# Patient Record
Sex: MALE | Race: WHITE | HISPANIC OR LATINO | Employment: OTHER | URBAN - METROPOLITAN AREA
[De-identification: names, ages, dates, MRNs, and addresses within clinical notes are randomized per-mention and may not be internally consistent; named-entity substitution may affect disease eponyms.]

---

## 2021-12-29 ENCOUNTER — CONSULT (OUTPATIENT)
Dept: ENDOCRINOLOGY | Facility: HOSPITAL | Age: 61
End: 2021-12-29
Payer: COMMERCIAL

## 2021-12-29 VITALS
HEIGHT: 64 IN | SYSTOLIC BLOOD PRESSURE: 110 MMHG | HEART RATE: 56 BPM | BODY MASS INDEX: 30.46 KG/M2 | DIASTOLIC BLOOD PRESSURE: 68 MMHG | WEIGHT: 178.4 LBS

## 2021-12-29 DIAGNOSIS — E05.90 THYROTOXICOSIS WITHOUT THYROID STORM, UNSPECIFIED THYROTOXICOSIS TYPE: Primary | ICD-10-CM

## 2021-12-29 PROBLEM — I10 HYPERTENSION: Status: ACTIVE | Noted: 2021-12-29

## 2021-12-29 PROCEDURE — 99204 OFFICE O/P NEW MOD 45 MIN: CPT | Performed by: INTERNAL MEDICINE

## 2021-12-29 RX ORDER — AMLODIPINE BESYLATE 5 MG/1
TABLET ORAL
COMMUNITY
Start: 2021-12-01 | End: 2022-06-03

## 2021-12-29 RX ORDER — LISINOPRIL 40 MG/1
TABLET ORAL
COMMUNITY
Start: 2021-12-08

## 2022-01-03 LAB
ALBUMIN SERPL-MCNC: 4.1 G/DL (ref 3.6–5.1)
ALBUMIN/GLOB SERPL: 1.3 (CALC) (ref 1–2.5)
ALP SERPL-CCNC: 69 U/L (ref 35–144)
ALT SERPL-CCNC: 34 U/L (ref 9–46)
AST SERPL-CCNC: 20 U/L (ref 10–35)
BASOPHILS # BLD AUTO: 33 CELLS/UL (ref 0–200)
BASOPHILS NFR BLD AUTO: 0.5 %
BILIRUB SERPL-MCNC: 0.4 MG/DL (ref 0.2–1.2)
BUN SERPL-MCNC: 25 MG/DL (ref 7–25)
BUN/CREAT SERPL: 17 (CALC) (ref 6–22)
CALCIUM SERPL-MCNC: 8.8 MG/DL (ref 8.6–10.3)
CHLORIDE SERPL-SCNC: 107 MMOL/L (ref 98–110)
CO2 SERPL-SCNC: 27 MMOL/L (ref 20–32)
CREAT SERPL-MCNC: 1.47 MG/DL (ref 0.7–1.25)
EOSINOPHIL # BLD AUTO: 169 CELLS/UL (ref 15–500)
EOSINOPHIL NFR BLD AUTO: 2.6 %
ERYTHROCYTE [DISTWIDTH] IN BLOOD BY AUTOMATED COUNT: 12.6 % (ref 11–15)
GLOBULIN SER CALC-MCNC: 3.2 G/DL (CALC) (ref 1.9–3.7)
GLUCOSE SERPL-MCNC: 88 MG/DL (ref 65–99)
HCT VFR BLD AUTO: 42.1 % (ref 38.5–50)
HGB BLD-MCNC: 14.3 G/DL (ref 13.2–17.1)
LYMPHOCYTES # BLD AUTO: 2665 CELLS/UL (ref 850–3900)
LYMPHOCYTES NFR BLD AUTO: 41 %
MCH RBC QN AUTO: 30 PG (ref 27–33)
MCHC RBC AUTO-ENTMCNC: 34 G/DL (ref 32–36)
MCV RBC AUTO: 88.3 FL (ref 80–100)
MONOCYTES # BLD AUTO: 501 CELLS/UL (ref 200–950)
MONOCYTES NFR BLD AUTO: 7.7 %
NEUTROPHILS # BLD AUTO: 3133 CELLS/UL (ref 1500–7800)
NEUTROPHILS NFR BLD AUTO: 48.2 %
PLATELET # BLD AUTO: 228 THOUSAND/UL (ref 140–400)
PMV BLD REES-ECKER: 10.9 FL (ref 7.5–12.5)
POTASSIUM SERPL-SCNC: 4.4 MMOL/L (ref 3.5–5.3)
PROT SERPL-MCNC: 7.3 G/DL (ref 6.1–8.1)
RBC # BLD AUTO: 4.77 MILLION/UL (ref 4.2–5.8)
SL AMB EGFR AFRICAN AMERICAN: 59 ML/MIN/1.73M2
SL AMB EGFR NON AFRICAN AMERICAN: 51 ML/MIN/1.73M2
SODIUM SERPL-SCNC: 138 MMOL/L (ref 135–146)
T3FREE SERPL-MCNC: 3.5 PG/ML (ref 2.3–4.2)
T4 FREE SERPL-MCNC: 1.3 NG/DL (ref 0.8–1.8)
THYROPEROXIDASE AB SERPL-ACNC: 1 IU/ML
TSH SERPL-ACNC: 0.16 MIU/L (ref 0.4–4.5)
TSI SER-ACNC: <89 % BASELINE
WBC # BLD AUTO: 6.5 THOUSAND/UL (ref 3.8–10.8)

## 2022-01-19 ENCOUNTER — CONSULT (OUTPATIENT)
Dept: NEPHROLOGY | Facility: CLINIC | Age: 62
End: 2022-01-19
Payer: COMMERCIAL

## 2022-01-19 VITALS
HEART RATE: 72 BPM | WEIGHT: 184.6 LBS | BODY MASS INDEX: 31.51 KG/M2 | RESPIRATION RATE: 16 BRPM | DIASTOLIC BLOOD PRESSURE: 100 MMHG | OXYGEN SATURATION: 97 % | TEMPERATURE: 97.5 F | HEIGHT: 64 IN | SYSTOLIC BLOOD PRESSURE: 170 MMHG

## 2022-01-19 DIAGNOSIS — N18.30 CKD (CHRONIC KIDNEY DISEASE) STAGE 3, GFR 30-59 ML/MIN (HCC): ICD-10-CM

## 2022-01-19 PROCEDURE — 99205 OFFICE O/P NEW HI 60 MIN: CPT | Performed by: INTERNAL MEDICINE

## 2022-01-19 NOTE — LETTER
January 19, 2022     Sofia Briseno MD  [address]    Patient: Chang Martinez   YOB: 1960   Date of Visit: 1/19/2022       Dear Dr Umesh Rg:    Thank you for referring Chang Martinez to me for evaluation  Below are my notes for this consultation  If you have questions, please do not hesitate to call me  I look forward to following your patient along with you  Sincerely,        John Zapata MD        CC: No Recipients  John Zapata MD  1/19/2022 10:39 AM  Incomplete  NEPHROLOGY OFFICE CONSULT  Chang Martinez 58 y o  male MRN: [id number]    Encounter: [id number] DATE: 1/19/2022    REASON FOR VISIT: Chang Martinez is a 58 y o male who was referred by Sofia Briseno MD for evaluation  Carlos Castelan HPI:    This is a 58 y o  M with PMH of Hypertension for at least 10 years, obesity, subclinical hyperthyroidism, CKD stage IIIa who is referred to Renal clinic by his PCP due to having new diagnosis of CKD  Patient was notified recently about elevated renal parameters  Upon review of labs from [facility], noted elevated S creatinine of 1 5 in Oct 2021  Labs obtained in Dec 29th, 2021 however revealing S Creatinine of 1 47 with eGFR of 51 as well  Patient admits to suboptimal fluid intake in his life time  States that during the latter half of 2021, his BP readings have been above target necessitating his PCP to increase Lisinopril to 40 mg daily and to start Amlodipine 5 mg daily  He doesn't check BP at home however recent visit to his Endocrinologist revealed BP of 937 mm Hg systolic  He denies foamy urine, edema in LE, flank pain or blood in urine  Denies having h/o NSAIDs in his life time  PAST MEDICAL HISTORY:  Past Medical History:   Diagnosis Date    Chronic kidney disease     Hypertension     Kidney stone        PAST SURGICAL HISTORY:  History reviewed  No pertinent surgical history      SOCIAL HISTORY:  Social History     Substance and Sexual Activity Alcohol Use None     Social History     Substance and Sexual Activity   Drug Use Not on file     Social History     Tobacco Use   Smoking Status Former Smoker   Smokeless Tobacco Never Used       FAMILY HISTORY:  Family History   Problem Relation Age of Onset    Hypertension Mother     Kidney disease Mother     Diabetes unspecified Mother     Heart disease Father     Heart disease Brother        ALLERGY:  Allergies   Allergen Reactions    Penicillins Hives       MEDICATIONS:    Current Outpatient Medications:     amLODIPine (NORVASC) 5 mg tablet, , Disp: , Rfl:     lisinopril (ZESTRIL) 40 mg tablet, , Disp: , Rfl:     REVIEW OF SYSTEMS:    Review of Systems   Constitutional: Negative  HENT: Negative  Eyes: Negative  Respiratory: Negative  Cardiovascular: Negative  Gastrointestinal: Negative  Endocrine: Negative  Genitourinary: Negative  Musculoskeletal: Negative  Skin: Negative  Allergic/Immunologic: Negative  Neurological: Negative  Hematological: Negative  All other systems reviewed and are negative  PHYSICAL EXAM:  Vitals:    01/19/22 0942   BP: 170/100   BP Location: Left arm   Patient Position: Sitting   Cuff Size: Standard   Pulse: 72   Resp: 16   Temp: 97 5 °F (36 4 °C)   TempSrc: Temporal   SpO2: 97%   Weight: 83 7 kg (184 lb 9 6 oz)   Height: 5' 4" (1 626 m)     Body mass index is 31 69 kg/m²  Physical Exam  HENT:      Head: Normocephalic and atraumatic  Eyes:      Pupils: Pupils are equal, round, and reactive to light  Neck:      Vascular: No JVD  Cardiovascular:      Rate and Rhythm: Normal rate and regular rhythm  Heart sounds: Normal heart sounds  No murmur heard  No friction rub  Pulmonary:      Effort: Pulmonary effort is normal       Breath sounds: Normal breath sounds  Abdominal:      General: Bowel sounds are normal  There is no distension  Palpations: Abdomen is soft  Tenderness: There is no abdominal tenderness  There is no rebound  Musculoskeletal:         General: No tenderness  Cervical back: Neck supple  Skin:     General: Skin is dry  Findings: No rash  Neurological:      Mental Status: He is alert and oriented to person, place, and time           LAB RESULTS:  Results for orders placed or performed in visit on 12/29/21   Comprehensive metabolic panel   Result Value Ref Range    Glucose, Random 88 65 - 99 mg/dL    BUN 25 7 - 25 mg/dL    Creatinine 1 47 (H) 0 70 - 1 25 mg/dL    eGFR Non  51 (L) > OR = 60 mL/min/1 73m2    eGFR  59 (L) > OR = 60 mL/min/1 73m2    SL AMB BUN/CREATININE RATIO 17 6 - 22 (calc)    Sodium 138 135 - 146 mmol/L    Potassium 4 4 3 5 - 5 3 mmol/L    Chloride 107 98 - 110 mmol/L    CO2 27 20 - 32 mmol/L    Calcium 8 8 8 6 - 10 3 mg/dL    Protein, Total 7 3 6 1 - 8 1 g/dL    Albumin 4 1 3 6 - 5 1 g/dL    Globulin 3 2 1 9 - 3 7 g/dL (calc)    Albumin/Globulin Ratio 1 3 1 0 - 2 5 (calc)    TOTAL BILIRUBIN 0 4 0 2 - 1 2 mg/dL    Alkaline Phosphatase 69 35 - 144 U/L    AST 20 10 - 35 U/L    ALT 34 9 - 46 U/L   Thyroid stimulating immunoglobulin   Result Value Ref Range    TSI <89 <140 % baseline   CBC and differential   Result Value Ref Range    White Blood Cell Count 6 5 3 8 - 10 8 Thousand/uL    Red Blood Cell Count 4 77 4 20 - 5 80 Million/uL    Hemoglobin 14 3 13 2 - 17 1 g/dL    HCT 42 1 38 5 - 50 0 %    MCV 88 3 80 0 - 100 0 fL    MCH 30 0 27 0 - 33 0 pg    MCHC 34 0 32 0 - 36 0 g/dL    RDW 12 6 11 0 - 15 0 %    Platelet Count 396 544 - 400 Thousand/uL    SL AMB MPV 10 9 7 5 - 12 5 fL    Neutrophils (Absolute) 3,133 1,500 - 7,800 cells/uL    Lymphocytes (Absolute) 2,665 850 - 3,900 cells/uL    Monocytes (Absolute) 501 200 - 950 cells/uL    Eosinophils (Absolute) 169 15 - 500 cells/uL    Basophils ABS 33 0 - 200 cells/uL    Neutrophils 48 2 %    Lymphocytes 41 0 %    Monocytes 7 7 %    Eosinophils 2 6 %    Basophils PCT 0 5 %   Anti-microsomal antibody Result Value Ref Range    Thyroid Peroxidase Antibodies 1 <9 IU/mL   T4, free   Result Value Ref Range    Free t4 1 3 0 8 - 1 8 ng/dL   TSH, 3rd generation   Result Value Ref Range    TSH 0 16 (L) 0 40 - 4 50 mIU/L   T3, free   Result Value Ref Range    Free T3 3 5 2 3 - 4 2 pg/mL         ASSESSMENT and PLAN:  Perla Garcia was seen today for chronic kidney disease and consult  Diagnoses and all orders for this visit:    CKD (chronic kidney disease) stage 3, GFR 30-59 ml/min ([facility])  -     Ambulatory referral to Nephrology  -     Immunoglobulin free LT chains blood; Future  -     Protein electrophoresis, serum; Future  -     US kidney and bladder; Future  -     Albumin; Standing  -     Calcium; Standing  -     CBC and differential; Standing  -     Comprehensive metabolic panel; Standing  -     Phosphorus; Standing  -     PTH, intact; Standing  -     Protein / creatinine ratio, urine; Standing  -     Urinalysis with microscopic; Standing  -     Vitamin D 25 hydroxy; Standing  -     Albumin  -     Calcium  -     CBC and differential  -     Comprehensive metabolic panel  -     Phosphorus  -     PTH, intact  -     Protein / creatinine ratio, urine  -     Urinalysis with microscopic  -     Vitamin D 25 hydroxy       58 y o  M with PMH of Hypertension, CKD stage IIIa, obesity, subclinical hyperthyroidism who presents to Renal clinic for evaluation of CKD  1) Chronic Kidney Disease stage IIIa: Etiology likely hypertensive nephrosclerosis, age  Based on recent labs S creatinine is 1 47 and at baseline  His baseline creatinine is established as 1 4-1 5 mg/dl  Strict BP control recommended to prevent progression of CKD  Avoidance of NSAIDs recommended as well  Obtain Renal US to evaluate renal parenchyma  Obtain UA, BMP, vitamin D, Ca, Phos, Urine Pr:Cr in 3 months  2) Hypertension due to CKD: BP in the office is elevated checked twice    Recommended obtaining home BP reading twice and document  Target BP is 140/90  Low salt diet recommended  On Lisinopril 40 mg daily as well as Amlodipine 5 mg daily    3) Obesity: Weight loss and exercise daily recommended  4) Nutrition : Low salt diet, moderate protein and potassium diet recommended  I have spent 54 minutes with Patient and family today in which greater than 50% of this time was spent in counseling/coordination of care regarding Diagnostic results, Prognosis, Risks and benefits of tx options, Intructions for management, Patient and family education, Importance of tx compliance, Risk factor reductions and Impressions  Abram Bravo

## 2022-01-19 NOTE — PROGRESS NOTES
NEPHROLOGY OFFICE CONSULT  Damien Brumfield 58 y o  male MRN: [id number]    Encounter: [id number] DATE: 1/19/2022    REASON FOR VISIT: Rachael Choe is a 58 y o male who was referred by Monserrat Albarran MD for evaluation  Caren Domingo HPI:    This is a 58 y o  M with PMH of Hypertension for at least 10 years, obesity, subclinical hyperthyroidism, CKD stage IIIa who is referred to Renal clinic by his PCP due to having new diagnosis of CKD  Patient was notified recently about elevated renal parameters  Upon review of labs from [facility], noted elevated S creatinine of 1 5 in Oct 2021  Labs obtained in Dec 29th, 2021 however revealing S Creatinine of 1 47 with eGFR of 51 as well  Patient admits to suboptimal fluid intake in his life time  States that during the latter half of 2021, his BP readings have been above target necessitating his PCP to increase Lisinopril to 40 mg daily and to start Amlodipine 5 mg daily  He doesn't check BP at home however recent visit to his Endocrinologist revealed BP of 899 mm Hg systolic  He denies foamy urine, edema in LE, flank pain or blood in urine  Denies having h/o NSAIDs in his life time  PAST MEDICAL HISTORY:  Past Medical History:   Diagnosis Date    Chronic kidney disease     Hypertension     Kidney stone        PAST SURGICAL HISTORY:  History reviewed  No pertinent surgical history      SOCIAL HISTORY:  Social History     Substance and Sexual Activity   Alcohol Use None     Social History     Substance and Sexual Activity   Drug Use Not on file     Social History     Tobacco Use   Smoking Status Former Smoker   Smokeless Tobacco Never Used       FAMILY HISTORY:  Family History   Problem Relation Age of Onset    Hypertension Mother     Kidney disease Mother     Diabetes unspecified Mother     Heart disease Father     Heart disease Brother        ALLERGY:  Allergies   Allergen Reactions    Penicillins Hives       MEDICATIONS:    Current Outpatient Medications:    amLODIPine (NORVASC) 5 mg tablet, , Disp: , Rfl:     lisinopril (ZESTRIL) 40 mg tablet, , Disp: , Rfl:     REVIEW OF SYSTEMS:    Review of Systems   Constitutional: Negative  HENT: Negative  Eyes: Negative  Respiratory: Negative  Cardiovascular: Negative  Gastrointestinal: Negative  Endocrine: Negative  Genitourinary: Negative  Musculoskeletal: Negative  Skin: Negative  Allergic/Immunologic: Negative  Neurological: Negative  Hematological: Negative  All other systems reviewed and are negative  PHYSICAL EXAM:  Vitals:    01/19/22 0942   BP: 170/100   BP Location: Left arm   Patient Position: Sitting   Cuff Size: Standard   Pulse: 72   Resp: 16   Temp: 97 5 °F (36 4 °C)   TempSrc: Temporal   SpO2: 97%   Weight: 83 7 kg (184 lb 9 6 oz)   Height: 5' 4" (1 626 m)     Body mass index is 31 69 kg/m²  Physical Exam  HENT:      Head: Normocephalic and atraumatic  Eyes:      Pupils: Pupils are equal, round, and reactive to light  Neck:      Vascular: No JVD  Cardiovascular:      Rate and Rhythm: Normal rate and regular rhythm  Heart sounds: Normal heart sounds  No murmur heard  No friction rub  Pulmonary:      Effort: Pulmonary effort is normal       Breath sounds: Normal breath sounds  Abdominal:      General: Bowel sounds are normal  There is no distension  Palpations: Abdomen is soft  Tenderness: There is no abdominal tenderness  There is no rebound  Musculoskeletal:         General: No tenderness  Cervical back: Neck supple  Skin:     General: Skin is dry  Findings: No rash  Neurological:      Mental Status: He is alert and oriented to person, place, and time           LAB RESULTS:  Results for orders placed or performed in visit on 12/29/21   Comprehensive metabolic panel   Result Value Ref Range    Glucose, Random 88 65 - 99 mg/dL    BUN 25 7 - 25 mg/dL    Creatinine 1 47 (H) 0 70 - 1 25 mg/dL    eGFR Non  51 (L) > OR = 60 mL/min/1 73m2    eGFR  59 (L) > OR = 60 mL/min/1 73m2    SL AMB BUN/CREATININE RATIO 17 6 - 22 (calc)    Sodium 138 135 - 146 mmol/L    Potassium 4 4 3 5 - 5 3 mmol/L    Chloride 107 98 - 110 mmol/L    CO2 27 20 - 32 mmol/L    Calcium 8 8 8 6 - 10 3 mg/dL    Protein, Total 7 3 6 1 - 8 1 g/dL    Albumin 4 1 3 6 - 5 1 g/dL    Globulin 3 2 1 9 - 3 7 g/dL (calc)    Albumin/Globulin Ratio 1 3 1 0 - 2 5 (calc)    TOTAL BILIRUBIN 0 4 0 2 - 1 2 mg/dL    Alkaline Phosphatase 69 35 - 144 U/L    AST 20 10 - 35 U/L    ALT 34 9 - 46 U/L   Thyroid stimulating immunoglobulin   Result Value Ref Range    TSI <89 <140 % baseline   CBC and differential   Result Value Ref Range    White Blood Cell Count 6 5 3 8 - 10 8 Thousand/uL    Red Blood Cell Count 4 77 4 20 - 5 80 Million/uL    Hemoglobin 14 3 13 2 - 17 1 g/dL    HCT 42 1 38 5 - 50 0 %    MCV 88 3 80 0 - 100 0 fL    MCH 30 0 27 0 - 33 0 pg    MCHC 34 0 32 0 - 36 0 g/dL    RDW 12 6 11 0 - 15 0 %    Platelet Count 880 138 - 400 Thousand/uL    SL AMB MPV 10 9 7 5 - 12 5 fL    Neutrophils (Absolute) 3,133 1,500 - 7,800 cells/uL    Lymphocytes (Absolute) 2,665 850 - 3,900 cells/uL    Monocytes (Absolute) 501 200 - 950 cells/uL    Eosinophils (Absolute) 169 15 - 500 cells/uL    Basophils ABS 33 0 - 200 cells/uL    Neutrophils 48 2 %    Lymphocytes 41 0 %    Monocytes 7 7 %    Eosinophils 2 6 %    Basophils PCT 0 5 %   Anti-microsomal antibody   Result Value Ref Range    Thyroid Peroxidase Antibodies 1 <9 IU/mL   T4, free   Result Value Ref Range    Free t4 1 3 0 8 - 1 8 ng/dL   TSH, 3rd generation   Result Value Ref Range    TSH 0 16 (L) 0 40 - 4 50 mIU/L   T3, free   Result Value Ref Range    Free T3 3 5 2 3 - 4 2 pg/mL         ASSESSMENT and PLAN:  Jason Ballard was seen today for chronic kidney disease and consult      Diagnoses and all orders for this visit:    CKD (chronic kidney disease) stage 3, GFR 30-59 ml/min ([facility] [address] )  -     Ambulatory referral to Nephrology  - Immunoglobulin free LT chains blood; Future  -     Protein electrophoresis, serum; Future  -     US kidney and bladder; Future  -     Albumin; Standing  -     Calcium; Standing  -     CBC and differential; Standing  -     Comprehensive metabolic panel; Standing  -     Phosphorus; Standing  -     PTH, intact; Standing  -     Protein / creatinine ratio, urine; Standing  -     Urinalysis with microscopic; Standing  -     Vitamin D 25 hydroxy; Standing  -     Albumin  -     Calcium  -     CBC and differential  -     Comprehensive metabolic panel  -     Phosphorus  -     PTH, intact  -     Protein / creatinine ratio, urine  -     Urinalysis with microscopic  -     Vitamin D 25 hydroxy       58 y o  M with PMH of Hypertension, CKD stage IIIa, obesity, subclinical hyperthyroidism who presents to Renal clinic for evaluation of CKD  1) Chronic Kidney Disease stage IIIa: Etiology likely hypertensive nephrosclerosis, age  Based on recent labs S creatinine is 1 47 and at baseline  His baseline creatinine is established as 1 4-1 5 mg/dl  Strict BP control recommended to prevent progression of CKD  Avoidance of NSAIDs recommended as well  Obtain Renal US to evaluate renal parenchyma  Obtain UA, BMP, vitamin D, Ca, Phos, Urine Pr:Cr in 3 months  2) Hypertension due to CKD: BP in the office is elevated checked twice  Recommended obtaining home BP reading twice and document  Target BP is 140/90  Low salt diet recommended  On Lisinopril 40 mg daily as well as Amlodipine 5 mg daily    3) Obesity: Weight loss and exercise daily recommended  4) Nutrition : Low salt diet, moderate protein and potassium diet recommended       I have spent 54 minutes with Patient and family today in which greater than 50% of this time was spent in counseling/coordination of care regarding Diagnostic results, Prognosis, Risks and benefits of tx options, Intructions for management, Patient and family education, Importance of tx compliance, Risk factor reductions and Impressions  Ricco Espana

## 2022-01-19 NOTE — PATIENT INSTRUCTIONS
Target fluid intake 2-2 5 L daily  Avoid NSAIDs  Ultrasound of kidney in 1 month   Check BP twice daily  Target -075 systolic   Diastolic < 90  Brand name for BP machine is [employer]

## 2022-01-20 ENCOUNTER — HOSPITAL ENCOUNTER (OUTPATIENT)
Dept: NUCLEAR MEDICINE | Facility: HOSPITAL | Age: 62
Discharge: HOME | End: 2022-01-20
Attending: INTERNAL MEDICINE
Payer: COMMERCIAL

## 2022-01-20 DIAGNOSIS — E05.90 THYROTOXICOSIS WITHOUT THYROID STORM, UNSPECIFIED THYROTOXICOSIS TYPE: ICD-10-CM

## 2022-01-20 PROCEDURE — A9516 IODINE I-123 SOD IODIDE MIC: HCPCS

## 2022-01-20 PROCEDURE — G1004 CDSM NDSC: HCPCS

## 2022-01-20 PROCEDURE — 78014 THYROID IMAGING W/BLOOD FLOW: CPT

## 2022-01-21 ENCOUNTER — HOSPITAL ENCOUNTER (OUTPATIENT)
Dept: NUCLEAR MEDICINE | Facility: HOSPITAL | Age: 62
Discharge: HOME | End: 2022-01-21
Attending: INTERNAL MEDICINE
Payer: COMMERCIAL

## 2022-01-28 ENCOUNTER — TELEPHONE (OUTPATIENT)
Dept: ENDOCRINOLOGY | Facility: HOSPITAL | Age: 62
End: 2022-01-28

## 2022-01-28 NOTE — TELEPHONE ENCOUNTER
vilma called back and given message from result note  Lab slips faxed to [facility] and US order is mailed  Can the other note be closed?

## 2022-02-21 ENCOUNTER — HOSPITAL ENCOUNTER (OUTPATIENT)
Dept: ULTRASOUND IMAGING | Facility: HOSPITAL | Age: 62
Discharge: HOME | End: 2022-02-21
Attending: INTERNAL MEDICINE
Payer: COMMERCIAL

## 2022-02-21 DIAGNOSIS — N18.30 CKD (CHRONIC KIDNEY DISEASE) STAGE 3, GFR 30-59 ML/MIN (HCC): ICD-10-CM

## 2022-02-21 DIAGNOSIS — E05.90 THYROTOXICOSIS WITHOUT THYROID STORM, UNSPECIFIED THYROTOXICOSIS TYPE: ICD-10-CM

## 2022-02-21 DIAGNOSIS — E04.1 THYROID NODULE: ICD-10-CM

## 2022-02-21 LAB
T3FREE SERPL-MCNC: 3.5 PG/ML (ref 2.3–4.2)
T4 FREE SERPL-MCNC: 1.4 NG/DL (ref 0.8–1.8)
TSH SERPL-ACNC: 0.12 MIU/L (ref 0.4–4.5)

## 2022-02-21 PROCEDURE — 76536 US EXAM OF HEAD AND NECK: CPT

## 2022-02-21 PROCEDURE — 76770 US EXAM ABDO BACK WALL COMP: CPT

## 2022-02-24 NOTE — RESULT ENCOUNTER NOTE
Please call the patient regarding result  Recent thyroid ultrasound shows only small scattered colloid cysts  No dominant nodules are noted  Readings do not meet criteria for biopsy or follow-up ultrasounds at this time

## 2022-05-21 LAB
T3FREE SERPL-MCNC: 3.2 PG/ML (ref 2.3–4.2)
T4 FREE SERPL-MCNC: 1 NG/DL (ref 0.8–1.8)
TSH SERPL-ACNC: 1.08 MIU/L (ref 0.4–4.5)

## 2022-05-23 ENCOUNTER — TELEPHONE (OUTPATIENT)
Dept: NEPHROLOGY | Facility: CLINIC | Age: 62
End: 2022-05-23

## 2022-05-23 NOTE — TELEPHONE ENCOUNTER
Patient called the office regarding his appointment with Dr Irving Pitts for 06/03/2022  Patient stated if his blood work script can be fax to [facility]      Blood work script faxed to [facility] Tel number [phone]        Faxed to [phone]

## 2022-05-24 ENCOUNTER — TELEPHONE (OUTPATIENT)
Dept: NEPHROLOGY | Facility: CLINIC | Age: 62
End: 2022-05-24

## 2022-05-27 ENCOUNTER — OFFICE VISIT (OUTPATIENT)
Dept: ENDOCRINOLOGY | Facility: HOSPITAL | Age: 62
End: 2022-05-27
Payer: COMMERCIAL

## 2022-05-27 VITALS
OXYGEN SATURATION: 96 % | HEIGHT: 64 IN | WEIGHT: 185 LBS | HEART RATE: 53 BPM | SYSTOLIC BLOOD PRESSURE: 152 MMHG | DIASTOLIC BLOOD PRESSURE: 86 MMHG | BODY MASS INDEX: 31.58 KG/M2

## 2022-05-27 DIAGNOSIS — E05.90 THYROTOXICOSIS WITHOUT THYROID STORM, UNSPECIFIED THYROTOXICOSIS TYPE: ICD-10-CM

## 2022-05-27 DIAGNOSIS — E04.1 THYROID NODULE: Primary | ICD-10-CM

## 2022-05-27 DIAGNOSIS — R63.5 WEIGHT GAIN: ICD-10-CM

## 2022-05-27 PROCEDURE — 99214 OFFICE O/P EST MOD 30 MIN: CPT | Performed by: INTERNAL MEDICINE

## 2022-05-27 RX ORDER — DEXAMETHASONE 1 MG
TABLET ORAL
Qty: 1 TABLET | Refills: 0 | Status: SHIPPED | OUTPATIENT
Start: 2022-05-27

## 2022-05-27 RX ORDER — METHIMAZOLE 5 MG/1
TABLET ORAL
Qty: 180 TABLET | Refills: 1 | Status: SHIPPED | OUTPATIENT
Start: 2022-05-27

## 2022-05-27 RX ORDER — ATORVASTATIN CALCIUM 10 MG/1
10 TABLET, FILM COATED ORAL DAILY
COMMUNITY
Start: 2022-04-29

## 2022-05-27 NOTE — PROGRESS NOTES
5/27/2022    Assessment/Plan      Diagnoses and all orders for this visit:    Thyroid nodule    Thyrotoxicosis without thyroid storm, unspecified thyrotoxicosis type  -     methimazole (TAPAZOLE) 5 mg tablet; 1 tab bid  -     TSH, 3rd generation; Future  -     T3, free; Future  -     T4, free; Future    Weight gain  -     Dexamethasone  -     dexamethasone (DECADRON) 1 mg tablet; Take 1 tab at 10:00 p m  The night before cortisol blood work  -     Cortisol Level, AM Specimen  -     Testosterone, free, total Lab Collect  -     Comprehensive metabolic panel Lab Collect  -     Insulin-like growth factor 1 (IGF-1) - Lab Collect  -     DHEA-sulfate- Lab Collect    Other orders  -     atorvastatin (LIPITOR) 10 mg tablet; Take 10 mg by mouth daily        Assessment/Plan:  1  Hyperthyroidism: Reduce methimazole to 10 mg total per day and repeat lab work which may not be done in so he is back from his trip  Follow-up in the office in 6 months  2  Weight gain: I do not believe this is related to the thyroid  I have offered additional testing as ordered above we will call the results  CC: Follow-up    History of Present Illness     HPI: Luisito Villasenor is a 58y o  year old male presents for a follow-up of subclinical hyperthyroidism  During initial workup, thyroid antibodies were negative  There was not increased uptake seen on thyroid scan  Ultrasound of the thyroid showed scattered colloid cysts but no dominant nodules  He was started at and methimazole at a dose of 5 mg which she has been taking 3 times daily  He presents today overall feeling okay  He continues to report unintentional weight gain  No temperature intolerance, fatigue, palpitations, tachycardia  He will be going to [facility] for 3 months  Review of Systems   Constitutional: Positive for unexpected weight change (gain)  Negative for fatigue  HENT: Negative for trouble swallowing and voice change  Eyes: Negative for visual disturbance  Respiratory: Negative for shortness of breath  Cardiovascular: Negative for palpitations and leg swelling  Gastrointestinal: Negative for abdominal pain, nausea and vomiting  Endocrine: Negative for polydipsia and polyuria  Musculoskeletal: Negative for arthralgias and myalgias  Skin: Negative for rash  Neurological: Negative for dizziness, tremors and weakness  Hematological: Negative for adenopathy  Psychiatric/Behavioral: Negative for agitation and confusion  Historical Information   Past Medical History:   Diagnosis Date    Chronic kidney disease     Hypertension     Kidney stone      History reviewed  No pertinent surgical history  Social History   Social History     Substance and Sexual Activity   Alcohol Use None     Social History     Substance and Sexual Activity   Drug Use Not on file     Social History     Tobacco Use   Smoking Status Former Smoker   Smokeless Tobacco Never Used     Family History:   Family History   Problem Relation Age of Onset    Hypertension Mother     Kidney disease Mother     Diabetes unspecified Mother     Heart disease Father     Heart disease Brother        Meds/Allergies   Current Outpatient Medications   Medication Sig Dispense Refill    amLODIPine (NORVASC) 5 mg tablet       atorvastatin (LIPITOR) 10 mg tablet Take 10 mg by mouth daily      dexamethasone (DECADRON) 1 mg tablet Take 1 tab at 10:00 p m  The night before cortisol blood work  1 tablet 0    lisinopril (ZESTRIL) 40 mg tablet       methimazole (TAPAZOLE) 5 mg tablet 1 tab bid 180 tablet 1     No current facility-administered medications for this visit  Allergies   Allergen Reactions    Penicillins Hives       Objective   Vitals: Blood pressure 152/86, pulse (!) 53, height 5' 4" (1 626 m), weight 83 9 kg (185 lb), SpO2 96 %  Invasive Devices  Report    None                 Physical Exam  Vitals reviewed  Constitutional:       General: He is not in acute distress  Appearance: He is well-developed  He is not diaphoretic  HENT:      Head: Normocephalic and atraumatic  Eyes:      Conjunctiva/sclera: Conjunctivae normal       Pupils: Pupils are equal, round, and reactive to light  Neck:      Thyroid: No thyromegaly  Cardiovascular:      Rate and Rhythm: Normal rate and regular rhythm  Pulmonary:      Effort: Pulmonary effort is normal  No respiratory distress  Breath sounds: Normal breath sounds  Abdominal:      General: Bowel sounds are normal       Palpations: Abdomen is soft  Musculoskeletal:         General: Normal range of motion  Cervical back: Normal range of motion and neck supple  Skin:     General: Skin is warm and dry  Findings: No rash  Neurological:      Mental Status: He is alert and oriented to person, place, and time  Motor: No abnormal muscle tone  Psychiatric:         Behavior: Behavior normal          The history was obtained from the review of the chart and from the patient  Lab Results:      Recent Results (from the past 84878 hour(s))   NOVEL CORONAVIRUS (COVID-19), PCR [facility]    Collection Time: 12/08/21  1:41 PM    Specimen type and source: Nasogastric aspirate (specimen)   Result Value Ref Range    SARS Coronavirus 2 PCR Not Detected Not Detected    Specimen Description NASOPHARYNX     First test? Unknown     Prior test type? Not applicable     Prior test result? Not applicable     Prior test date?       ^^^UNKNA^Unknown or not applicable^L^^^Unknown or not applicable    Employed in healthcare setting? Unknown     Symptomatic as defined by CDC? Unknown     Date of symptom onset?       ^^^UNKNA^Unknown or not applicable^L^^^Unknown or not applicable    Currently hospitalized? Unknown     In ICU? Unknown     Resident in congregate care setting? Unknown     Pregnant?  Not applicable    Comprehensive metabolic panel    Collection Time: 12/29/21  9:27 AM   Result Value Ref Range    Glucose, Random 88 65 - 99 mg/dL BUN 25 7 - 25 mg/dL    Creatinine 1 47 (H) 0 70 - 1 25 mg/dL    eGFR Non  51 (L) > OR = 60 mL/min/1 73m2    eGFR  59 (L) > OR = 60 mL/min/1 73m2    SL AMB BUN/CREATININE RATIO 17 6 - 22 (calc)    Sodium 138 135 - 146 mmol/L    Potassium 4 4 3 5 - 5 3 mmol/L    Chloride 107 98 - 110 mmol/L    CO2 27 20 - 32 mmol/L    Calcium 8 8 8 6 - 10 3 mg/dL    Protein, Total 7 3 6 1 - 8 1 g/dL    Albumin 4 1 3 6 - 5 1 g/dL    Globulin 3 2 1 9 - 3 7 g/dL (calc)    Albumin/Globulin Ratio 1 3 1 0 - 2 5 (calc)    TOTAL BILIRUBIN 0 4 0 2 - 1 2 mg/dL    Alkaline Phosphatase 69 35 - 144 U/L    AST 20 10 - 35 U/L    ALT 34 9 - 46 U/L   Thyroid stimulating immunoglobulin    Collection Time: 12/29/21  9:27 AM   Result Value Ref Range    TSI <89 <140 % baseline   CBC and differential    Collection Time: 12/29/21  9:27 AM   Result Value Ref Range    White Blood Cell Count 6 5 3 8 - 10 8 Thousand/uL    Red Blood Cell Count 4 77 4 20 - 5 80 Million/uL    Hemoglobin 14 3 13 2 - 17 1 g/dL    HCT 42 1 38 5 - 50 0 %    MCV 88 3 80 0 - 100 0 fL    MCH 30 0 27 0 - 33 0 pg    MCHC 34 0 32 0 - 36 0 g/dL    RDW 12 6 11 0 - 15 0 %    Platelet Count 883 659 - 400 Thousand/uL    SL AMB MPV 10 9 7 5 - 12 5 fL    Neutrophils (Absolute) 3,133 1,500 - 7,800 cells/uL    Lymphocytes (Absolute) 2,665 850 - 3,900 cells/uL    Monocytes (Absolute) 501 200 - 950 cells/uL    Eosinophils (Absolute) 169 15 - 500 cells/uL    Basophils ABS 33 0 - 200 cells/uL    Neutrophils 48 2 %    Lymphocytes 41 0 %    Monocytes 7 7 %    Eosinophils 2 6 %    Basophils PCT 0 5 %   Anti-microsomal antibody    Collection Time: 12/29/21  9:27 AM   Result Value Ref Range    Thyroid Peroxidase Antibodies 1 <9 IU/mL   T4, free    Collection Time: 12/29/21  9:27 AM   Result Value Ref Range    Free t4 1 3 0 8 - 1 8 ng/dL   TSH, 3rd generation    Collection Time: 12/29/21  9:27 AM   Result Value Ref Range    TSH 0 16 (L) 0 40 - 4 50 mIU/L   T3, free Collection Time: 12/29/21  9:27 AM   Result Value Ref Range    Free T3 3 5 2 3 - 4 2 pg/mL   T4, free    Collection Time: 02/21/22  8:45 AM   Result Value Ref Range    Free t4 1 4 0 8 - 1 8 ng/dL   TSH, 3rd generation    Collection Time: 02/21/22  8:45 AM   Result Value Ref Range    TSH 0 12 (L) 0 40 - 4 50 mIU/L   T3, free    Collection Time: 02/21/22  8:45 AM   Result Value Ref Range    Free T3 3 5 2 3 - 4 2 pg/mL   [facility]    Collection Time: 03/13/22 11:16 AM    Specimen: Other   Result Value Ref Range    SARS-CoV-2 Negative Negative   T4, free    Collection Time: 05/20/22  1:10 PM   Result Value Ref Range    Free t4 1 0 0 8 - 1 8 ng/dL   TSH, 3rd generation    Collection Time: 05/20/22  1:10 PM   Result Value Ref Range    TSH 1 08 0 40 - 4 50 mIU/L   T3, free    Collection Time: 05/20/22  1:10 PM   Result Value Ref Range    Free T3 3 2 2 3 - 4 2 pg/mL         Future Appointments   Date Time Provider [facility]   6/3/2022  9:30 AM Shirley Arboleda MD [address] [place] [address]       Portions of the record may have been created with voice recognition software  Occasional wrong word or "sound a like" substitutions may have occurred due to the inherent limitations of voice recognition software  Read the chart carefully and recognize, using context, where substitutions have occurred

## 2022-05-28 LAB
25(OH)D3 SERPL-MCNC: 24 NG/ML (ref 30–100)
ALBUMIN SERPL-MCNC: 4.2 G/DL (ref 3.6–5.1)
ALBUMIN/GLOB SERPL: 1.4 (CALC) (ref 1–2.5)
ALP SERPL-CCNC: 66 U/L (ref 35–144)
ALT SERPL-CCNC: 17 U/L (ref 9–46)
APPEARANCE UR: CLEAR
AST SERPL-CCNC: 14 U/L (ref 10–35)
BACTERIA UR QL AUTO: ABNORMAL /HPF
BASOPHILS # BLD AUTO: 43 CELLS/UL (ref 0–200)
BASOPHILS NFR BLD AUTO: 0.7 %
BILIRUB SERPL-MCNC: 0.5 MG/DL (ref 0.2–1.2)
BILIRUB UR QL STRIP: NEGATIVE
BUN SERPL-MCNC: 21 MG/DL (ref 7–25)
BUN/CREAT SERPL: 13 (CALC) (ref 6–22)
CALCIUM SERPL-MCNC: 9.2 MG/DL (ref 8.6–10.3)
CALCIUM SERPL-MCNC: 9.2 MG/DL (ref 8.6–10.3)
CHLORIDE SERPL-SCNC: 106 MMOL/L (ref 98–110)
CO2 SERPL-SCNC: 27 MMOL/L (ref 20–32)
COLOR UR: YELLOW
CREAT SERPL-MCNC: 1.6 MG/DL (ref 0.7–1.25)
CREAT UR-MCNC: 50 MG/DL (ref 20–320)
EOSINOPHIL # BLD AUTO: 174 CELLS/UL (ref 15–500)
EOSINOPHIL NFR BLD AUTO: 2.8 %
ERYTHROCYTE [DISTWIDTH] IN BLOOD BY AUTOMATED COUNT: 12.5 % (ref 11–15)
GLOBULIN SER CALC-MCNC: 3.1 G/DL (CALC) (ref 1.9–3.7)
GLUCOSE SERPL-MCNC: 87 MG/DL (ref 65–99)
GLUCOSE UR QL STRIP: NEGATIVE
HCT VFR BLD AUTO: 39.9 % (ref 38.5–50)
HGB BLD-MCNC: 13.4 G/DL (ref 13.2–17.1)
HGB UR QL STRIP: NEGATIVE
HYALINE CASTS #/AREA URNS LPF: ABNORMAL /LPF
KETONES UR QL STRIP: NEGATIVE
LEUKOCYTE ESTERASE UR QL STRIP: NEGATIVE
LYMPHOCYTES # BLD AUTO: 2350 CELLS/UL (ref 850–3900)
LYMPHOCYTES NFR BLD AUTO: 37.9 %
MCH RBC QN AUTO: 29.3 PG (ref 27–33)
MCHC RBC AUTO-ENTMCNC: 33.6 G/DL (ref 32–36)
MCV RBC AUTO: 87.3 FL (ref 80–100)
MONOCYTES # BLD AUTO: 465 CELLS/UL (ref 200–950)
MONOCYTES NFR BLD AUTO: 7.5 %
NEUTROPHILS # BLD AUTO: 3168 CELLS/UL (ref 1500–7800)
NEUTROPHILS NFR BLD AUTO: 51.1 %
NITRITE UR QL STRIP: NEGATIVE
PH UR STRIP: 5.5 [PH] (ref 5–8)
PHOSPHATE SERPL-MCNC: 3 MG/DL (ref 2.5–4.5)
PLATELET # BLD AUTO: 230 THOUSAND/UL (ref 140–400)
PMV BLD REES-ECKER: 10.5 FL (ref 7.5–12.5)
POTASSIUM SERPL-SCNC: 4.2 MMOL/L (ref 3.5–5.3)
PROT SERPL-MCNC: 7.3 G/DL (ref 6.1–8.1)
PROT UR QL STRIP: ABNORMAL
PROT UR-MCNC: 82 MG/DL (ref 5–25)
PROT/CREAT UR: 1.64 MG/MG CREAT (ref 0.02–0.13)
PROT/CREAT UR: 1640 MG/G CREAT (ref 22–128)
PTH-INTACT SERPL-MCNC: 32 PG/ML (ref 16–77)
RBC # BLD AUTO: 4.57 MILLION/UL (ref 4.2–5.8)
RBC #/AREA URNS HPF: ABNORMAL /HPF
SL AMB EGFR AFRICAN AMERICAN: 53 ML/MIN/1.73M2
SL AMB EGFR NON AFRICAN AMERICAN: 45 ML/MIN/1.73M2
SODIUM SERPL-SCNC: 140 MMOL/L (ref 135–146)
SP GR UR STRIP: 1.01 (ref 1–1.03)
SQUAMOUS #/AREA URNS HPF: ABNORMAL /HPF
WBC # BLD AUTO: 6.2 THOUSAND/UL (ref 3.8–10.8)
WBC #/AREA URNS HPF: ABNORMAL /HPF

## 2022-06-03 ENCOUNTER — OFFICE VISIT (OUTPATIENT)
Dept: NEPHROLOGY | Facility: CLINIC | Age: 62
End: 2022-06-03
Payer: COMMERCIAL

## 2022-06-03 VITALS
HEART RATE: 68 BPM | SYSTOLIC BLOOD PRESSURE: 150 MMHG | OXYGEN SATURATION: 96 % | DIASTOLIC BLOOD PRESSURE: 80 MMHG | WEIGHT: 184 LBS | BODY MASS INDEX: 31.41 KG/M2 | HEIGHT: 64 IN | TEMPERATURE: 97.4 F | RESPIRATION RATE: 16 BRPM

## 2022-06-03 DIAGNOSIS — I10 PRIMARY HYPERTENSION: Primary | ICD-10-CM

## 2022-06-03 DIAGNOSIS — N18.32 STAGE 3B CHRONIC KIDNEY DISEASE (HCC): ICD-10-CM

## 2022-06-03 PROCEDURE — 99214 OFFICE O/P EST MOD 30 MIN: CPT | Performed by: INTERNAL MEDICINE

## 2022-06-03 RX ORDER — AMLODIPINE BESYLATE 5 MG/1
10 TABLET ORAL DAILY
Qty: 180 TABLET | Refills: 3 | Status: SHIPPED | OUTPATIENT
Start: 2022-06-03

## 2022-06-03 NOTE — PROGRESS NOTES
NEPHROLOGY PROGRESS NOTE    Patient: Tomasa Persaud               Sex: male          DOA: No admission date for patient encounter  YOB: 1960        Age:  58 y o           6/3/2022        BACKGROUND     58 M with PMH of Hypertension, hyperthyroidism, CKD IIIa who is following up in Renal clinic since Jan 2022    SUBJECTIVE     Patient following up in Renal clinic after 4 months  Checks his BP often with most BP reading being 729 mm Hg systolic  Admits to at least 96 oz of water daily  Avoids NSAIDs  Denies seeing foamy urine or blood in urine  REVIEW OF SYSTEMS     Review of Systems   Constitutional: Negative  HENT: Negative  Eyes: Negative  Respiratory: Negative  Cardiovascular: Negative  Gastrointestinal: Negative  Endocrine: Negative  Genitourinary: Negative  Musculoskeletal: Negative  Skin: Negative  Allergic/Immunologic: Negative  Neurological: Negative  Hematological: Negative  All other systems reviewed and are negative  OBJECTIVE     Current Weight: Weight - Scale: 83 5 kg (184 lb)  Vitals:    06/03/22 0926   BP: 150/80   Pulse: 68   Resp: 16   Temp: (!) 97 4 °F (36 3 °C)   SpO2: 96%     Body mass index is 31 58 kg/m²  CURRENT MEDICATIONS       Current Outpatient Medications:     amLODIPine (NORVASC) 5 mg tablet, Take 2 tablets (10 mg total) by mouth daily, Disp: 180 tablet, Rfl: 3    atorvastatin (LIPITOR) 10 mg tablet, Take 10 mg by mouth daily, Disp: , Rfl:     dexamethasone (DECADRON) 1 mg tablet, Take 1 tab at 10:00 p m  The night before cortisol blood work , Disp: 1 tablet, Rfl: 0    lisinopril (ZESTRIL) 40 mg tablet, , Disp: , Rfl:     methimazole (TAPAZOLE) 5 mg tablet, 1 tab bid (Patient taking differently: 2 (two) times a day), Disp: 180 tablet, Rfl: 1      PHYSICAL EXAMINATION     Physical Exam  HENT:      Head: Normocephalic and atraumatic  Eyes:      Pupils: Pupils are equal, round, and reactive to light     Neck: Vascular: No JVD  Cardiovascular:      Rate and Rhythm: Normal rate and regular rhythm  Heart sounds: Normal heart sounds  No murmur heard  No friction rub  Pulmonary:      Effort: Pulmonary effort is normal       Breath sounds: Normal breath sounds  Abdominal:      General: Bowel sounds are normal  There is no distension  Palpations: Abdomen is soft  Tenderness: There is no abdominal tenderness  There is no rebound  Musculoskeletal:         General: No tenderness  Cervical back: Neck supple  Skin:     General: Skin is dry  Findings: No rash  Neurological:      Mental Status: He is alert and oriented to person, place, and time  LAB RESULTS       Outside labs:   5/27/22  Creatinine=1 6  Urine Pr:Cr=1 6 g        RADIOLOGY RESULTS      Reviewed    ASSESSMENT/PLAN     58 M with PMH of Hypertension, hyperthyroidism, CKD IIIa who is following up in Renal clinic    1) Chronic Kidney Disease stage IIIa: Rapid decline in renal parameters since 2020 with relatively well controlled hypertension  Creatinine was 1 5 mg/dl in Dec 2021  Now noted to be 1 6 mg/dl despite adequate hydration  Renal US with bilateral cysts in kidney  Obtain paraproteinemia work up today due to proteinuria noted  2) Proteinuria: Noted to have 1 6 g of proteinuria  Etiology likely hypertensive nephrosclerosis   Will rule out paraproteinemia  Repeat renal parameters including urine studies in 3 months once patient returns from [facility]  If worsening proteinuria and/or creatinine, consider renal biopsy    3) Hypertension due to CKD: BP is elevated at 869 mm Hg systolic  Increase Amlodipine to 5 mg PO bid  Low salt diet recommended        I have spent 46 minutes with Patient and family today in which greater than 50% of this time was spent in counseling/coordination of care regarding Diagnostic results, Prognosis, Risks and benefits of tx options, Intructions for management, Patient and family education, Importance of tx compliance, Risk factor reductions and Impressions              Sylvain Love MD  Nephrology  6/3/2022

## 2022-06-03 NOTE — LETTER
Kasey 3, 2022     Brett Interiano MD  [address] [facility] [address] [facility] [place]    Patient: Minda Steven   YOB: 1960   Date of Visit: 6/3/2022       Dear Dr Gladys Juan:    Thank you for referring Minda Steven to me for evaluation  Below are my notes for this consultation  If you have questions, please do not hesitate to call me  I look forward to following your patient along with you  Sincerely,        Vandana Looney MD        CC: No Recipients  Vandana Looney MD  6/3/2022 10:27 AM  Incomplete  NEPHROLOGY PROGRESS NOTE    Patient: Minda Steven               Sex: male          DOA: No admission date for patient encounter  YOB: 1960        Age:  58 y o           6/3/2022        BACKGROUND     58 M with PMH of Hypertension, hyperthyroidism, CKD IIIa who is following up in Renal clinic since Jan 2022    SUBJECTIVE     Patient following up in Renal clinic after 4 months  Checks his BP often with most BP reading being 901 mm Hg systolic  Admits to at least 96 oz of water daily  Avoids NSAIDs  Denies seeing foamy urine or blood in urine  REVIEW OF SYSTEMS     Review of Systems   Constitutional: Negative  HENT: Negative  Eyes: Negative  Respiratory: Negative  Cardiovascular: Negative  Gastrointestinal: Negative  Endocrine: Negative  Genitourinary: Negative  Musculoskeletal: Negative  Skin: Negative  Allergic/Immunologic: Negative  Neurological: Negative  Hematological: Negative  All other systems reviewed and are negative  OBJECTIVE     Current Weight: Weight - Scale: 83 5 kg (184 lb)  Vitals:    06/03/22 0926   BP: 150/80   Pulse: 68   Resp: 16   Temp: (!) 97 4 °F (36 3 °C)   SpO2: 96%     Body mass index is 31 58 kg/m²        CURRENT MEDICATIONS       Current Outpatient Medications:     amLODIPine (NORVASC) 5 mg tablet, Take 2 tablets (10 mg total) by mouth daily, Disp: 180 tablet, Rfl: 3    atorvastatin (LIPITOR) 10 mg tablet, Take 10 mg by mouth daily, Disp: , Rfl:     dexamethasone (DECADRON) 1 mg tablet, Take 1 tab at 10:00 p m  The night before cortisol blood work , Disp: 1 tablet, Rfl: 0    lisinopril (ZESTRIL) 40 mg tablet, , Disp: , Rfl:     methimazole (TAPAZOLE) 5 mg tablet, 1 tab bid (Patient taking differently: 2 (two) times a day), Disp: 180 tablet, Rfl: 1      PHYSICAL EXAMINATION     Physical Exam  HENT:      Head: Normocephalic and atraumatic  Eyes:      Pupils: Pupils are equal, round, and reactive to light  Neck:      Vascular: No JVD  Cardiovascular:      Rate and Rhythm: Normal rate and regular rhythm  Heart sounds: Normal heart sounds  No murmur heard  No friction rub  Pulmonary:      Effort: Pulmonary effort is normal       Breath sounds: Normal breath sounds  Abdominal:      General: Bowel sounds are normal  There is no distension  Palpations: Abdomen is soft  Tenderness: There is no abdominal tenderness  There is no rebound  Musculoskeletal:         General: No tenderness  Cervical back: Neck supple  Skin:     General: Skin is dry  Findings: No rash  Neurological:      Mental Status: He is alert and oriented to person, place, and time  LAB RESULTS       Outside labs:   5/27/22  Creatinine=1 6  Urine Pr:Cr=1 6 g        RADIOLOGY RESULTS      Reviewed    ASSESSMENT/PLAN     58 M with PMH of Hypertension, hyperthyroidism, CKD IIIa who is following up in Renal clinic    1) Chronic Kidney Disease stage IIIa: Rapid decline in renal parameters since 2020 with relatively well controlled hypertension  Creatinine was 1 5 mg/dl in Dec 2021  Now noted to be 1 6 mg/dl despite adequate hydration  Renal US with bilateral cysts in kidney  Obtain paraproteinemia work up today due to proteinuria noted  2) Proteinuria: Noted to have 1 6 g of proteinuria     Etiology likely hypertensive nephrosclerosis   Will rule out paraproteinemia  Repeat renal parameters including urine studies in 3 months once patient returns from [facility]  If worsening proteinuria and/or creatinine, consider renal biopsy    3) Hypertension due to CKD: BP is elevated at 847 mm Hg systolic  Increase Amlodipine to 5 mg PO bid  Low salt diet recommended  I have spent 46 minutes with Patient and family today in which greater than 50% of this time was spent in counseling/coordination of care regarding Diagnostic results, Prognosis, Risks and benefits of tx options, Intructions for management, Patient and family education, Importance of tx compliance, Risk factor reductions and Impressions              Tevin Bravo MD  Nephrology  6/3/2022

## 2022-06-06 ENCOUNTER — TELEPHONE (OUTPATIENT)
Dept: NEPHROLOGY | Facility: CLINIC | Age: 62
End: 2022-06-06

## 2022-06-06 LAB
ALBUMIN SERPL ELPH-MCNC: 4.2 G/DL (ref 3.8–4.8)
ALPHA1 GLOB SERPL ELPH-MCNC: 0.3 G/DL (ref 0.2–0.3)
ALPHA2 GLOB SERPL ELPH-MCNC: 0.6 G/DL (ref 0.5–0.9)
BETA1 GLOB SERPL ELPH-MCNC: 0.5 G/DL (ref 0.4–0.6)
BETA2 GLOB SERPL ELPH-MCNC: 0.5 G/DL (ref 0.2–0.5)
GAMMA GLOB SERPL ELPH-MCNC: 1.6 G/DL (ref 0.8–1.7)
KAPPA LC FREE SER-MCNC: 44.1 MG/L (ref 3.3–19.4)
KAPPA LC FREE/LAMBDA FREE SER: 1.28 {RATIO} (ref 0.26–1.65)
LAMBDA LC FREE SERPL-MCNC: 34.4 MG/L (ref 5.7–26.3)
PROT PATTERN SERPL ELPH-IMP: NORMAL
PROT SERPL-MCNC: 7.6 G/DL (ref 6.1–8.1)

## 2022-06-06 NOTE — TELEPHONE ENCOUNTER
Good Morning,    Dr Krish Henderson just [address] [address] [place] patient's wife Alicja Munguia call the office to let you know patient's blood work was done           [phone]

## 2022-09-18 DIAGNOSIS — E05.90 THYROTOXICOSIS WITHOUT THYROID STORM, UNSPECIFIED THYROTOXICOSIS TYPE: ICD-10-CM

## 2022-09-19 RX ORDER — METHIMAZOLE 5 MG/1
TABLET ORAL
Qty: 180 TABLET | Refills: 0 | Status: SHIPPED | OUTPATIENT
Start: 2022-09-19

## 2022-10-04 ENCOUNTER — TELEPHONE (OUTPATIENT)
Dept: NEPHROLOGY | Facility: CLINIC | Age: 62
End: 2022-10-04

## 2022-10-05 ENCOUNTER — OFFICE VISIT (OUTPATIENT)
Dept: NEPHROLOGY | Facility: CLINIC | Age: 62
End: 2022-10-05
Payer: COMMERCIAL

## 2022-10-05 VITALS
BODY MASS INDEX: 31.76 KG/M2 | RESPIRATION RATE: 16 BRPM | OXYGEN SATURATION: 96 % | SYSTOLIC BLOOD PRESSURE: 160 MMHG | HEART RATE: 74 BPM | TEMPERATURE: 97.8 F | WEIGHT: 186 LBS | HEIGHT: 64 IN | DIASTOLIC BLOOD PRESSURE: 90 MMHG

## 2022-10-05 DIAGNOSIS — N18.31 STAGE 3A CHRONIC KIDNEY DISEASE (HCC): Primary | ICD-10-CM

## 2022-10-05 PROCEDURE — 99214 OFFICE O/P EST MOD 30 MIN: CPT | Performed by: INTERNAL MEDICINE

## 2022-10-05 NOTE — PROGRESS NOTES
NEPHROLOGY PROGRESS NOTE    Patient: Eren Sam               Sex: male          DOA: No admission date for patient encounter  YOB: 1960        Age:  58 y o          10/5/2022        BACKGROUND     58 y o  M with PMH of CKD III, hypertension, CKD III, hyperthyroidism who presents to renal clinic    SUBJECTIVE     Patient presents after 4 months  Spent 90 days in [facility] and just returned last week  Did not gain weight  Did not check his BP either  Episodic edema in lower extremities  REVIEW OF SYSTEMS     Review of Systems   Constitutional: Negative  HENT: Negative  Eyes: Negative  Respiratory: Negative  Cardiovascular: Negative  Gastrointestinal: Negative  Endocrine: Negative  Genitourinary: Negative  Musculoskeletal: Negative  Skin: Negative  Allergic/Immunologic: Negative  Neurological: Negative  Hematological: Negative  All other systems reviewed and are negative  OBJECTIVE     Current Weight: Weight - Scale: 84 4 kg (186 lb)  Vitals:    10/05/22 1319   BP: 160/90   Pulse: 74   Resp: 16   Temp: 97 8 °F (36 6 °C)   SpO2: 96%     Body mass index is 31 93 kg/m²  [unfilled]    CURRENT MEDICATIONS       Current Outpatient Medications:     amLODIPine (NORVASC) 5 mg tablet, Take 2 tablets (10 mg total) by mouth daily, Disp: 180 tablet, Rfl: 3    atorvastatin (LIPITOR) 10 mg tablet, Take 10 mg by mouth daily, Disp: , Rfl:     lisinopril (ZESTRIL) 40 mg tablet, , Disp: , Rfl:     methimazole (TAPAZOLE) 5 mg tablet, 1 tab bid, Disp: 180 tablet, Rfl: 0    dexamethasone (DECADRON) 1 mg tablet, Take 1 tab at 10:00 p m  The night before cortisol blood work  (Patient not taking: Reported on 10/5/2022), Disp: 1 tablet, Rfl: 0      PHYSICAL EXAMINATION     Physical Exam  HENT:      Head: Normocephalic and atraumatic  Eyes:      Pupils: Pupils are equal, round, and reactive to light  Neck:      Vascular: No JVD     Cardiovascular:      Rate and Rhythm: Normal rate and regular rhythm  Heart sounds: Normal heart sounds  No murmur heard  No friction rub  Pulmonary:      Effort: Pulmonary effort is normal       Breath sounds: Normal breath sounds  Abdominal:      General: Bowel sounds are normal  There is no distension  Palpations: Abdomen is soft  Tenderness: There is no abdominal tenderness  There is no rebound  Musculoskeletal:         General: No tenderness  Cervical back: Neck supple  Skin:     General: Skin is dry  Findings: No rash  Neurological:      Mental Status: He is alert and oriented to person, place, and time  LAB RESULTS     Results from last 6 Months   Lab Units 05/27/22  0913   WHITE BLOOD CELL COUNT  Thousand/uL 6 2   HEMOGLOBIN  g/dL 13 4   HEMATOCRIT  % 39 9   PLATELETS  Thousand/uL 230   POTASSIUM mmol/L 4 2   CHLORIDE mmol/L 106   CO2 mmol/L 27   BUN mg/dL 21   CREATININE mg/dL 1 60*   CALCIUM mg/dL 9 2  9 2             RADIOLOGY RESULTS      Renal US:    Simple cyst in kidney (read by me)      ASSESSMENT/PLAN     58 M with PMH of hypertension, proteinuria, obesity who presents to renal clinic for f/u    1) Chronic Kidney Disease stage IIIa: Etiology likely hypertensive nephrosclerosis and age related nephron loss  Serological work up has been negative  Patient did not undergo Chem -7 or Urine protein quantification prior to this appointment  Will ask patient to obtain Chem 7 and UA with urine Pr:Cr  Renal US without any hydronephrosis  2) Hypertension: BP was elevated upon arrival however repeat measurement revealed 145/85 and improving  Recommended home BP monitoring  Low salt diet recommended    3) Proteinuria: Previous quantification had revealed 1 6 grams  SPEP negative for paraprotein  On Lisinopril 40 mg daily  Likely etiology is hypertension  Serological work up negative    4) Obesity: BMI is 31  Noted 30 lbs weight gain since start of this year     Recommended enrolling in weight loss program      Await repeat labs               John Zapata  Nephrology  10/5/2022

## 2022-10-05 NOTE — LETTER
October 5, 2022     Sofia Briseno MD  [address] [facility] [address] [facility]    Patient: Chang Martinez   YOB: 1960   Date of Visit: 10/5/2022       Dear Dr Umesh Rg:    Thank you for referring Chang Martinez to me for evaluation  Below are my notes for this consultation  If you have questions, please do not hesitate to call me  I look forward to following your patient along with you  Sincerely,        John Zapata MD        CC: No Recipients  John Zapata MD  10/5/2022  1:48 PM  Incomplete  NEPHROLOGY PROGRESS NOTE    Patient: Chang Martinez               Sex: male          DOA: No admission date for patient encounter  YOB: 1960        Age:  58 y o          10/5/2022        BACKGROUND     58 y o  M with PMH of CKD III, hypertension, CKD III, hyperthyroidism who presents to renal clinic    SUBJECTIVE     Patient presents after 4 months  Spent 90 days in [place] [facility] and just returned last week  Did not gain weight  Did not check his BP either  Episodic edema in lower extremities  REVIEW OF SYSTEMS     Review of Systems   Constitutional: Negative  HENT: Negative  Eyes: Negative  Respiratory: Negative  Cardiovascular: Negative  Gastrointestinal: Negative  Endocrine: Negative  Genitourinary: Negative  Musculoskeletal: Negative  Skin: Negative  Allergic/Immunologic: Negative  Neurological: Negative  Hematological: Negative  All other systems reviewed and are negative  OBJECTIVE     Current Weight: Weight - Scale: 84 4 kg (186 lb)  Vitals:    10/05/22 1319   BP: 160/90   Pulse: 74   Resp: 16   Temp: 97 8 °F (36 6 °C)   SpO2: 96%     Body mass index is 31 93 kg/m²    [unfilled]    CURRENT MEDICATIONS       Current Outpatient Medications:     amLODIPine (NORVASC) 5 mg tablet, Take 2 tablets (10 mg total) by mouth daily, Disp: 180 tablet, Rfl: 3    atorvastatin (LIPITOR) 10 mg tablet, Take 10 mg by mouth daily, Disp: , Rfl:    lisinopril (ZESTRIL) 40 mg tablet, , Disp: , Rfl:     methimazole (TAPAZOLE) 5 mg tablet, 1 tab bid, Disp: 180 tablet, Rfl: 0    dexamethasone (DECADRON) 1 mg tablet, Take 1 tab at 10:00 p m  The night before cortisol blood work  (Patient not taking: Reported on 10/5/2022), Disp: 1 tablet, Rfl: 0      PHYSICAL EXAMINATION     Physical Exam  HENT:      Head: Normocephalic and atraumatic  Eyes:      Pupils: Pupils are equal, round, and reactive to light  Neck:      Vascular: No JVD  Cardiovascular:      Rate and Rhythm: Normal rate and regular rhythm  Heart sounds: Normal heart sounds  No murmur heard  No friction rub  Pulmonary:      Effort: Pulmonary effort is normal       Breath sounds: Normal breath sounds  Abdominal:      General: Bowel sounds are normal  There is no distension  Palpations: Abdomen is soft  Tenderness: There is no abdominal tenderness  There is no rebound  Musculoskeletal:         General: No tenderness  Cervical back: Neck supple  Skin:     General: Skin is dry  Findings: No rash  Neurological:      Mental Status: He is alert and oriented to person, place, and time  LAB RESULTS     Results from last 6 Months   Lab Units 05/27/22  0913   WHITE BLOOD CELL COUNT  Thousand/uL 6 2   HEMOGLOBIN  g/dL 13 4   HEMATOCRIT  % 39 9   PLATELETS  Thousand/uL 230   POTASSIUM mmol/L 4 2   CHLORIDE mmol/L 106   CO2 mmol/L 27   BUN mg/dL 21   CREATININE mg/dL 1 60*   CALCIUM mg/dL 9 2  9 2             RADIOLOGY RESULTS      Renal US:    Simple cyst in kidney (read by me)      ASSESSMENT/PLAN     58 M with PMH of hypertension, proteinuria, obesity who presents to renal clinic for f/u    1) Chronic Kidney Disease stage IIIa: Etiology likely hypertensive nephrosclerosis and age related nephron loss  Serological work up has been negative  Patient did not undergo Chem -7 or Urine protein quantification prior to this appointment     Will ask patient to obtain Chem 7 and UA with urine Pr:Cr  Renal US without any hydronephrosis  2) Hypertension: BP was elevated upon arrival however repeat measurement revealed 145/85 and improving  Recommended home BP monitoring  Low salt diet recommended    3) Proteinuria: Previous quantification had revealed 1 6 grams  SPEP negative for paraprotein  On Lisinopril 40 mg daily  Likely etiology is hypertension  Serological work up negative    4) Obesity: BMI is 31  Noted 30 lbs weight gain since start of this year  Recommended enrolling in weight loss program      Await repeat labs               Susana Officer  Nephrology  10/5/2022

## 2022-10-06 LAB
ANA SER QL IF: NEGATIVE
C3 SERPL-MCNC: 156 MG/DL (ref 82–185)
C4 SERPL-MCNC: 43 MG/DL (ref 15–53)
DSDNA AB SER-ACNC: <1 IU/ML
HBV CORE AB SERPL QL IA: NORMAL
HBV SURFACE AB SER QL IA: NORMAL
HBV SURFACE AG SERPL QL IA: NORMAL
HCV AB S/CO SERPL IA: 0.13
HCV AB SERPL QL IA: NORMAL
MYELOPEROXIDASE AB SER IA-ACNC: <1 AI
PROTEINASE3 AB SER IA-ACNC: <1 AI

## 2022-10-07 LAB
ANA SER QL IF: NEGATIVE
ANCA AB SER QL: NEGATIVE
C3 SERPL-MCNC: 156 MG/DL (ref 82–185)
C4 SERPL-MCNC: 43 MG/DL (ref 15–53)
DSDNA AB SER-ACNC: <1 IU/ML
HBV CORE AB SERPL QL IA: NORMAL
HBV SURFACE AB SER QL IA: NORMAL
HBV SURFACE AG SERPL QL IA: NORMAL
HCV AB S/CO SERPL IA: 0.13
HCV AB SERPL QL IA: NORMAL
MYELOPEROXIDASE AB SER IA-ACNC: <1 AI
PROTEINASE3 AB SER IA-ACNC: <1 AI

## 2022-10-08 LAB
25(OH)D3 SERPL-MCNC: 25 NG/ML (ref 30–100)
ALBUMIN SERPL-MCNC: 3.8 G/DL (ref 3.6–5.1)
ALBUMIN/GLOB SERPL: 1.3 (CALC) (ref 1–2.5)
ALP SERPL-CCNC: 73 U/L (ref 35–144)
ALT SERPL-CCNC: 18 U/L (ref 9–46)
APPEARANCE UR: CLEAR
AST SERPL-CCNC: 15 U/L (ref 10–35)
BACTERIA UR QL AUTO: ABNORMAL /HPF
BASOPHILS # BLD AUTO: 29 CELLS/UL (ref 0–200)
BASOPHILS NFR BLD AUTO: 0.5 %
BILIRUB SERPL-MCNC: 0.4 MG/DL (ref 0.2–1.2)
BILIRUB UR QL STRIP: NEGATIVE
BUN SERPL-MCNC: 24 MG/DL (ref 7–25)
BUN/CREAT SERPL: 15 (CALC) (ref 6–22)
CALCIUM SERPL-MCNC: 8.7 MG/DL (ref 8.6–10.3)
CALCIUM SERPL-MCNC: 8.7 MG/DL (ref 8.6–10.3)
CHLORIDE SERPL-SCNC: 109 MMOL/L (ref 98–110)
CO2 SERPL-SCNC: 25 MMOL/L (ref 20–32)
COLOR UR: YELLOW
CREAT SERPL-MCNC: 1.63 MG/DL (ref 0.7–1.35)
CREAT UR-MCNC: 123 MG/DL (ref 20–320)
EOSINOPHIL # BLD AUTO: 268 CELLS/UL (ref 15–500)
EOSINOPHIL NFR BLD AUTO: 4.7 %
ERYTHROCYTE [DISTWIDTH] IN BLOOD BY AUTOMATED COUNT: 12.4 % (ref 11–15)
GFR/BSA.PRED SERPLBLD CYS-BASED-ARV: 47 ML/MIN/1.73M2
GLOBULIN SER CALC-MCNC: 3 G/DL (CALC) (ref 1.9–3.7)
GLUCOSE SERPL-MCNC: 84 MG/DL (ref 65–99)
GLUCOSE UR QL STRIP: NEGATIVE
HCT VFR BLD AUTO: 39.9 % (ref 38.5–50)
HGB BLD-MCNC: 13.3 G/DL (ref 13.2–17.1)
HGB UR QL STRIP: NEGATIVE
HYALINE CASTS #/AREA URNS LPF: ABNORMAL /LPF
KETONES UR QL STRIP: NEGATIVE
LEUKOCYTE ESTERASE UR QL STRIP: NEGATIVE
LYMPHOCYTES # BLD AUTO: 2252 CELLS/UL (ref 850–3900)
LYMPHOCYTES NFR BLD AUTO: 39.5 %
MCH RBC QN AUTO: 30 PG (ref 27–33)
MCHC RBC AUTO-ENTMCNC: 33.3 G/DL (ref 32–36)
MCV RBC AUTO: 89.9 FL (ref 80–100)
MONOCYTES # BLD AUTO: 502 CELLS/UL (ref 200–950)
MONOCYTES NFR BLD AUTO: 8.8 %
NEUTROPHILS # BLD AUTO: 2651 CELLS/UL (ref 1500–7800)
NEUTROPHILS NFR BLD AUTO: 46.5 %
NITRITE UR QL STRIP: NEGATIVE
PH UR STRIP: 5.5 [PH] (ref 5–8)
PHOSPHATE SERPL-MCNC: 2.9 MG/DL (ref 2.5–4.5)
PLATELET # BLD AUTO: 215 THOUSAND/UL (ref 140–400)
PMV BLD REES-ECKER: 10.8 FL (ref 7.5–12.5)
POTASSIUM SERPL-SCNC: 4.2 MMOL/L (ref 3.5–5.3)
PROT SERPL-MCNC: 6.8 G/DL (ref 6.1–8.1)
PROT UR QL STRIP: ABNORMAL
PROT UR-MCNC: 200 MG/DL (ref 5–25)
PROT/CREAT UR: 1.63 MG/MG CREAT (ref 0.03–0.15)
PROT/CREAT UR: 1626 MG/G CREAT (ref 25–148)
PTH-INTACT SERPL-MCNC: 61 PG/ML (ref 16–77)
RBC # BLD AUTO: 4.44 MILLION/UL (ref 4.2–5.8)
RBC #/AREA URNS HPF: ABNORMAL /HPF
SODIUM SERPL-SCNC: 139 MMOL/L (ref 135–146)
SP GR UR STRIP: 1.02 (ref 1–1.03)
SQUAMOUS #/AREA URNS HPF: ABNORMAL /HPF
WBC # BLD AUTO: 5.7 THOUSAND/UL (ref 3.8–10.8)
WBC #/AREA URNS HPF: ABNORMAL /HPF

## 2022-10-10 ENCOUNTER — TELEPHONE (OUTPATIENT)
Dept: NEPHROLOGY | Facility: CLINIC | Age: 62
End: 2022-10-10

## 2022-10-10 DIAGNOSIS — R80.1 PERSISTENT PROTEINURIA: Primary | ICD-10-CM

## 2022-10-10 RX ORDER — SPIRONOLACTONE 25 MG/1
25 TABLET ORAL DAILY
Qty: 90 TABLET | Refills: 3 | Status: SHIPPED | OUTPATIENT
Start: 2022-10-10

## 2022-10-10 NOTE — TELEPHONE ENCOUNTER
----- Message from Meena Portillo MD sent at 10/10/2022  8:18 AM EDT -----  Please call patient back and inform that I have received his latest urine studies and kidney function tests results  Inform that his creatinine is same as in May 2022, 1 6 and protein in the urine is 1 6 grams which is same as in May 2022  He needs to be started on a second protein lowering medication along with Lisinopril  I will send the new medication Aldactone 25 mg daily in AM  He needs to take Amlodipine and Lisinopril as well     Dr Sanabria

## 2022-10-10 NOTE — TELEPHONE ENCOUNTER
Called spoke with patient spouse Elijah Renteria advised her as per Dr George he has received patient latest urine studies and kidney function tests results, advised patient spouse his creatinine level are the same as in May at 1 6 and protein in the urine is 1 6 grams which are same as in May results, advised as per Dr George he needs to be started on a second protein lowering medication along with Lisinopril  And he will send the new medication Aldactone 25 mg daily to be taken in the morning patient needs to take Amlodipine and Lisinopril as well, patient spouse Elijah Renteria understood and is okay with it

## 2022-12-01 ENCOUNTER — TELEPHONE (OUTPATIENT)
Dept: ENDOCRINOLOGY | Facility: HOSPITAL | Age: 62
End: 2022-12-01

## 2022-12-05 ENCOUNTER — TELEPHONE (OUTPATIENT)
Dept: ENDOCRINOLOGY | Facility: HOSPITAL | Age: 62
End: 2022-12-05

## 2022-12-05 DIAGNOSIS — R63.5 WEIGHT GAIN: ICD-10-CM

## 2022-12-05 RX ORDER — DEXAMETHASONE 1 MG
TABLET ORAL
Qty: 1 TABLET | Refills: 0 | Status: SHIPPED | OUTPATIENT
Start: 2022-12-05 | End: 2022-12-07 | Stop reason: ALTCHOICE

## 2022-12-05 NOTE — TELEPHONE ENCOUNTER
Patient took the Dexamethasone tablet last night but couldn't get labs drawn between 7-9 am so he is going back tomorrow  He needs a new tablet sent to the pharmacy  I just want to make sure because I don't see a cortisol level ordered or that you have sent the tablet over recently? Unless I'm missing it?

## 2022-12-05 NOTE — TELEPHONE ENCOUNTER
Will send in a new tablet  It should be the labs ordered 5/27/22  Should I order them again? Thanks

## 2022-12-06 LAB — CORTIS AM PEAK SERPL-MCNC: 0.8 MCG/DL

## 2022-12-07 ENCOUNTER — OFFICE VISIT (OUTPATIENT)
Dept: ENDOCRINOLOGY | Facility: CLINIC | Age: 62
End: 2022-12-07

## 2022-12-07 VITALS
WEIGHT: 186.6 LBS | DIASTOLIC BLOOD PRESSURE: 98 MMHG | HEIGHT: 64 IN | HEART RATE: 49 BPM | BODY MASS INDEX: 31.86 KG/M2 | SYSTOLIC BLOOD PRESSURE: 160 MMHG

## 2022-12-07 DIAGNOSIS — E05.90 HYPERTHYROIDISM: Primary | ICD-10-CM

## 2022-12-07 NOTE — PROGRESS NOTES
12/7/2022    Assessment/Plan      Diagnoses and all orders for this visit:    Hyperthyroidism        Assessment/Plan:  Subclinical hyperthyroidism: He had lab work done earlier this week  We will try to acquire this and call with results and make an adjustment with his methimazole regimen as needed  Follow-up in the office in 6 months but we have in touch with him as soon as we can  Thyroid nodules: These are colloid cyst and low risk  CC: Follow-up    History of Present Illness     HPI: Abena Lance is a 58y o  year old male who presents for a follow-up of subclinical hyperthyroidism  During initial work-up, thyroid antibodies were negative  There was not increased uptake seen on thyroid scan  Ultrasound of the thyroid showed scattered colloid cyst but no dominant nodules  He was initially treated with methimazole at a dose of 15 mg daily which was titrated to 10 mg daily at last appointment  He is currently on methimazole 5 mg bid  He presents today overall feeling well  Notes persistent difficulty with weight management despite healthy lifestyle and eating and activity  Review of Systems   Constitutional: Negative for fatigue  HENT: Negative for trouble swallowing and voice change  Eyes: Negative for visual disturbance  Respiratory: Negative for shortness of breath  Cardiovascular: Negative for palpitations and leg swelling  Gastrointestinal: Negative for abdominal pain, nausea and vomiting  Endocrine: Negative for polydipsia and polyuria  Musculoskeletal: Negative for arthralgias and myalgias  Skin: Negative for rash  Neurological: Negative for dizziness, tremors and weakness  Hematological: Negative for adenopathy  Psychiatric/Behavioral: Negative for agitation and confusion         Historical Information   Past Medical History:   Diagnosis Date   • Chronic kidney disease    • Hypertension    • Kidney stone      Past Surgical History:   Procedure Laterality Date   • TOE SURGERY Left 1982    Big toe     Social History   Social History     Substance and Sexual Activity   Alcohol Use Not Currently    Comment: Socially     Social History     Substance and Sexual Activity   Drug Use Never     Social History     Tobacco Use   Smoking Status Former   Smokeless Tobacco Never     Family History:   Family History   Problem Relation Age of Onset   • Hypertension Mother    • Kidney disease Mother    • Diabetes unspecified Mother         Past Aug 2005 kidney failure   • Heart disease Father    • Diabetes unspecified Father         Past 8/9728 heart complications   • Heart disease Brother    • Diabetes unspecified Brother         2015 had heart attack put 2 stents in       Meds/Allergies   Current Outpatient Medications   Medication Sig Dispense Refill   • amLODIPine (NORVASC) 5 mg tablet Take 2 tablets (10 mg total) by mouth daily 180 tablet 3   • atorvastatin (LIPITOR) 10 mg tablet Take 10 mg by mouth daily     • lisinopril (ZESTRIL) 40 mg tablet Take 40 mg by mouth daily     • methimazole (TAPAZOLE) 5 mg tablet 1 tab bid 180 tablet 0   • spironolactone (ALDACTONE) 25 mg tablet Take 1 tablet (25 mg total) by mouth daily 90 tablet 3     No current facility-administered medications for this visit  Allergies   Allergen Reactions   • Penicillins Hives       Objective   Vitals: Blood pressure 160/98, pulse (!) 49, height 5' 4" (1 626 m), weight 84 6 kg (186 lb 9 6 oz)  Invasive Devices     None                 Physical Exam  Vitals reviewed  Constitutional:       General: He is not in acute distress  Appearance: He is well-developed  He is not diaphoretic  HENT:      Head: Normocephalic and atraumatic  Eyes:      Conjunctiva/sclera: Conjunctivae normal       Pupils: Pupils are equal, round, and reactive to light  Neck:      Thyroid: No thyromegaly  Cardiovascular:      Rate and Rhythm: Normal rate and regular rhythm     Pulmonary:      Effort: Pulmonary effort is normal  No respiratory distress  Breath sounds: Normal breath sounds  Abdominal:      General: Bowel sounds are normal       Palpations: Abdomen is soft  Musculoskeletal:         General: Normal range of motion  Cervical back: Normal range of motion and neck supple  Skin:     General: Skin is warm and dry  Findings: No rash  Neurological:      Mental Status: He is alert and oriented to person, place, and time  Motor: No abnormal muscle tone  Psychiatric:         Behavior: Behavior normal          The history was obtained from the review of the chart and from the patient  Lab Results:      Component      Latest Ref Rng & Units 10/7/2022 10/7/2022 12/6/2022          10:28 AM 10:28 AM    White Blood Cell Count      3 8 - 10 8 Thousand/uL 5 7     Red Blood Cell Count      4 20 - 5 80 Million/uL 4 44     Hemoglobin      13 2 - 17 1 g/dL 13 3     HCT      38 5 - 50 0 % 39 9     MCV      80 0 - 100 0 fL 89 9     MCH      27 0 - 33 0 pg 30 0     MCHC        32 0 - 36 0 g/dL 33 3     RDW      11 0 - 15 0 % 12 4     Platelet Count      995 - 400 Thousand/uL 215     SL AMB MPV      7 5 - 12 5 fL 10 8     Neutrophils (Absolute)      1,500 - 7,800 cells/uL 2,651     Lymphocytes (Absolute)      850 - 3,900 cells/uL 2,252     Monocytes (Absolute)      200 - 950 cells/uL 502     Eosinophils (Absolute)      15 - 500 cells/uL 268     Basophils ABS      0 - 200 cells/uL 29     Neutrophils      % 46 5     Lymphocytes      % 39 5     Monocytes      % 8 8     Eosinophils      % 4 7     Basophils PCT      % 0 5     Glucose, Random      65 - 99 mg/dL  84    BUN      7 - 25 mg/dL  24    Creatinine      0 70 - 1 35 mg/dL  1 63 (H)    eGFR      > OR = 60 mL/min/1 73m2  47 (L)    SL AMB BUN/CREATININE RATIO      6 - 22 (calc)  15    Sodium      135 - 146 mmol/L  139    Potassium      3 5 - 5 3 mmol/L  4 2    Chloride      98 - 110 mmol/L  109    CO2      20 - 32 mmol/L  25    Calcium      8 6 - 10 3 mg/dL 8 7 8 7    Total Protein      6 1 - 8 1 g/dL  6 8    Albumin      3 6 - 5 1 g/dL  3 8    Globulin      1 9 - 3 7 g/dL (calc)  3 0    Albumin/Globulin Ratio      1 0 - 2 5 (calc)  1 3    TOTAL BILIRUBIN      0 2 - 1 2 mg/dL  0 4    Alkaline Phosphatase      35 - 144 U/L  73    AST      10 - 35 U/L  15    ALT      9 - 46 U/L  18    PTH, Intact      16 - 77 pg/mL 61     Phosphorus      2 5 - 4 5 mg/dL 2 9     Cortisol AM      mcg/dL   0 8 (L)       Future Appointments   Date Time Provider Reyes Ceballos   6/9/2023  8:50 AM Branden Thakkar DO [facility]       Portions of the record may have been created with voice recognition software  Occasional wrong word or "sound a like" substitutions may have occurred due to the inherent limitations of voice recognition software  Read the chart carefully and recognize, using context, where substitutions have occurred

## 2022-12-15 LAB
ALBUMIN SERPL-MCNC: 4.3 G/DL (ref 3.6–5.1)
ALBUMIN/GLOB SERPL: 1.5 (CALC) (ref 1–2.5)
ALP SERPL-CCNC: 83 U/L (ref 35–144)
ALT SERPL-CCNC: 21 U/L (ref 9–46)
AST SERPL-CCNC: 16 U/L (ref 10–35)
BILIRUB SERPL-MCNC: 0.5 MG/DL (ref 0.2–1.2)
BUN SERPL-MCNC: 23 MG/DL (ref 7–25)
BUN/CREAT SERPL: 12 (CALC) (ref 6–22)
CALCIUM SERPL-MCNC: 9.3 MG/DL (ref 8.6–10.3)
CHLORIDE SERPL-SCNC: 107 MMOL/L (ref 98–110)
CO2 SERPL-SCNC: 24 MMOL/L (ref 20–32)
CREAT SERPL-MCNC: 1.85 MG/DL (ref 0.7–1.35)
DEXAMETHASONE SERPL-MCNC: 464 NG/DL
DHEA-S SERPL-MCNC: 76 MCG/DL (ref 20–217)
GFR/BSA.PRED SERPLBLD CYS-BASED-ARV: 41 ML/MIN/1.73M2
GLOBULIN SER CALC-MCNC: 2.9 G/DL (CALC) (ref 1.9–3.7)
GLUCOSE SERPL-MCNC: 111 MG/DL (ref 65–99)
IGF-I SERPL-MCNC: 120 NG/ML (ref 41–279)
IGF-I Z-SCORE SERPL: -0.1 SD
POTASSIUM SERPL-SCNC: 4.5 MMOL/L (ref 3.5–5.3)
PROT SERPL-MCNC: 7.2 G/DL (ref 6.1–8.1)
SODIUM SERPL-SCNC: 139 MMOL/L (ref 135–146)
TESTOST FREE SERPL-MCNC: 41.4 PG/ML (ref 35–155)
TESTOST SERPL-MCNC: 225 NG/DL (ref 250–1100)

## 2022-12-16 DIAGNOSIS — E05.90 HYPERTHYROIDISM: Primary | ICD-10-CM

## 2023-02-17 ENCOUNTER — TELEPHONE (OUTPATIENT)
Dept: NEPHROLOGY | Facility: CLINIC | Age: 63
End: 2023-02-17

## 2023-02-17 NOTE — TELEPHONE ENCOUNTER
Dell Afternoon,      Hi, Team patient has appointment schedule with Dr Lopez Hearing for 07/12/2023  Patient stated if we can fax the blood work script to [facility]        [facility] Fax number is [phone]    Thank you

## 2023-03-02 LAB
25(OH)D3 SERPL-MCNC: 20 NG/ML (ref 30–100)
ALBUMIN SERPL-MCNC: 4.3 G/DL (ref 3.6–5.1)
ALBUMIN/GLOB SERPL: 1.4 (CALC) (ref 1–2.5)
ALP SERPL-CCNC: 78 U/L (ref 35–144)
ALT SERPL-CCNC: 16 U/L (ref 9–46)
APPEARANCE UR: CLEAR
AST SERPL-CCNC: 15 U/L (ref 10–35)
BACTERIA UR QL AUTO: ABNORMAL /HPF
BASOPHILS # BLD AUTO: 51 CELLS/UL (ref 0–200)
BASOPHILS NFR BLD AUTO: 0.8 %
BILIRUB SERPL-MCNC: 0.6 MG/DL (ref 0.2–1.2)
BILIRUB UR QL STRIP: NEGATIVE
BUN SERPL-MCNC: 26 MG/DL (ref 7–25)
BUN/CREAT SERPL: 14 (CALC) (ref 6–22)
CALCIUM SERPL-MCNC: 9.4 MG/DL (ref 8.6–10.3)
CALCIUM SERPL-MCNC: 9.4 MG/DL (ref 8.6–10.3)
CHLORIDE SERPL-SCNC: 105 MMOL/L (ref 98–110)
CO2 SERPL-SCNC: 24 MMOL/L (ref 20–32)
COLOR UR: YELLOW
CREAT SERPL-MCNC: 1.84 MG/DL (ref 0.7–1.35)
CREAT UR-MCNC: 67 MG/DL (ref 20–320)
EOSINOPHIL # BLD AUTO: 122 CELLS/UL (ref 15–500)
EOSINOPHIL NFR BLD AUTO: 1.9 %
ERYTHROCYTE [DISTWIDTH] IN BLOOD BY AUTOMATED COUNT: 11.8 % (ref 11–15)
GFR/BSA.PRED SERPLBLD CYS-BASED-ARV: 41 ML/MIN/1.73M2
GLOBULIN SER CALC-MCNC: 3 G/DL (CALC) (ref 1.9–3.7)
GLUCOSE SERPL-MCNC: 92 MG/DL (ref 65–99)
GLUCOSE UR QL STRIP: NEGATIVE
HCT VFR BLD AUTO: 40.2 % (ref 38.5–50)
HGB BLD-MCNC: 13.7 G/DL (ref 13.2–17.1)
HGB UR QL STRIP: NEGATIVE
HYALINE CASTS #/AREA URNS LPF: ABNORMAL /LPF
KETONES UR QL STRIP: NEGATIVE
LEUKOCYTE ESTERASE UR QL STRIP: NEGATIVE
LYMPHOCYTES # BLD AUTO: 1651 CELLS/UL (ref 850–3900)
LYMPHOCYTES NFR BLD AUTO: 25.8 %
MCH RBC QN AUTO: 29.7 PG (ref 27–33)
MCHC RBC AUTO-ENTMCNC: 34.1 G/DL (ref 32–36)
MCV RBC AUTO: 87.2 FL (ref 80–100)
MONOCYTES # BLD AUTO: 448 CELLS/UL (ref 200–950)
MONOCYTES NFR BLD AUTO: 7 %
NEUTROPHILS # BLD AUTO: 4128 CELLS/UL (ref 1500–7800)
NEUTROPHILS NFR BLD AUTO: 64.5 %
NITRITE UR QL STRIP: NEGATIVE
PH UR STRIP: 6.5 [PH] (ref 5–8)
PHOSPHATE SERPL-MCNC: 2.8 MG/DL (ref 2.5–4.5)
PLATELET # BLD AUTO: 201 THOUSAND/UL (ref 140–400)
PMV BLD REES-ECKER: 10.8 FL (ref 7.5–12.5)
POTASSIUM SERPL-SCNC: 4.6 MMOL/L (ref 3.5–5.3)
PROT SERPL-MCNC: 7.3 G/DL (ref 6.1–8.1)
PROT UR QL STRIP: ABNORMAL
PROT UR-MCNC: 56 MG/DL (ref 5–25)
PROT/CREAT UR: 0.84 MG/MG CREAT (ref 0.03–0.15)
PROT/CREAT UR: 836 MG/G CREAT (ref 25–148)
PTH-INTACT SERPL-MCNC: 56 PG/ML (ref 16–77)
RBC # BLD AUTO: 4.61 MILLION/UL (ref 4.2–5.8)
RBC #/AREA URNS HPF: ABNORMAL /HPF
SODIUM SERPL-SCNC: 138 MMOL/L (ref 135–146)
SP GR UR STRIP: 1.01 (ref 1–1.03)
SQUAMOUS #/AREA URNS HPF: ABNORMAL /HPF
WBC # BLD AUTO: 6.4 THOUSAND/UL (ref 3.8–10.8)
WBC #/AREA URNS HPF: ABNORMAL /HPF

## 2023-04-05 DIAGNOSIS — E05.90 THYROTOXICOSIS WITHOUT THYROID STORM, UNSPECIFIED THYROTOXICOSIS TYPE: ICD-10-CM

## 2023-04-05 RX ORDER — METHIMAZOLE 5 MG/1
TABLET ORAL
Qty: 180 TABLET | Refills: 0 | Status: SHIPPED | OUTPATIENT
Start: 2023-04-05

## 2023-05-26 ENCOUNTER — TELEPHONE (OUTPATIENT)
Dept: NEPHROLOGY | Facility: CLINIC | Age: 63
End: 2023-05-26

## 2023-05-26 NOTE — TELEPHONE ENCOUNTER
Patients wife Navya Exon called the office to reschedule patients appointment due to patient will be in a business trip and will not be able to make it for his appointment   Blood work order was fax to [address] [facility], [place] @ [phone]

## 2023-05-31 ENCOUNTER — TELEPHONE (OUTPATIENT)
Dept: NEPHROLOGY | Facility: CLINIC | Age: 63
End: 2023-05-31

## 2023-05-31 NOTE — TELEPHONE ENCOUNTER
Appointment was confirmed with patient   Reminded patient to have blood work done as per patient he will go to [address] today to have blood work done

## 2023-06-01 ENCOUNTER — OFFICE VISIT (OUTPATIENT)
Dept: NEPHROLOGY | Facility: CLINIC | Age: 63
End: 2023-06-01

## 2023-06-01 VITALS
DIASTOLIC BLOOD PRESSURE: 80 MMHG | BODY MASS INDEX: 32.17 KG/M2 | HEART RATE: 76 BPM | WEIGHT: 188.4 LBS | SYSTOLIC BLOOD PRESSURE: 152 MMHG | RESPIRATION RATE: 16 BRPM | HEIGHT: 64 IN | OXYGEN SATURATION: 98 % | TEMPERATURE: 98 F

## 2023-06-01 DIAGNOSIS — R80.1 PERSISTENT PROTEINURIA: Primary | ICD-10-CM

## 2023-06-01 RX ORDER — SPIRONOLACTONE 25 MG/1
TABLET ORAL
Qty: 180 TABLET | Refills: 3 | Status: SHIPPED | OUTPATIENT
Start: 2023-06-01

## 2023-06-01 NOTE — PATIENT INSTRUCTIONS
Follow a moderate potassium diet            Take Vitamin D 3 1 tablet daily  Target fluid intake is 64 oz daily  Take 1 1/2 tablet of Spironolactone 25 mg daily

## 2023-06-01 NOTE — LETTER
June 1, 2023     iLgia George    Patient: Ligia George   YOB: 1960   Date of Visit: 6/1/2023       Dear Dr Radha Gallo: Thank you for referring Ligia George to me for evaluation  Below are my notes for this consultation  If you have questions, please do not hesitate to call me  I look forward to following your patient along with you  Sincerely,        Elisa Edwards MD        CC: No Recipients    Elisa Edwards MD  6/1/2023  2:54 PM  Incomplete  NEPHROLOGY PROGRESS NOTE    Patient: Ligia George               Sex: male          DOA: No admission date for patient encounter  YOB: 1960        Age:  61 y o          6/1/2023        BACKGROUND     58 y o  M with PMH of CKD III, hypertension, CKD III, hyperthyroidism who presents to renal clinic    SUBJECTIVE     Patient presents after 6 months  He returned from [facility] with his family  Admits to having gained weight further  Denies any lower extremity edema, difficulty voiding shortness of breath or chest pain  Checks blood pressure at home occasionally  REVIEW OF SYSTEMS     Review of Systems   Constitutional: Negative  HENT: Negative  Eyes: Negative  Respiratory: Negative  Cardiovascular: Negative  Gastrointestinal: Negative  Endocrine: Negative  Genitourinary: Negative  Musculoskeletal: Negative  Skin: Negative  Allergic/Immunologic: Negative  Neurological: Negative  Hematological: Negative  All other systems reviewed and are negative  OBJECTIVE     Current Weight: Weight - Scale: 85 5 kg (188 lb 6 4 oz)  Vitals:    06/01/23 1331   BP: 152/80   Pulse: 76   Resp: 16   Temp: 98 °F (36 7 °C)   SpO2: 98%     Body mass index is 32 34 kg/m²    [unfilled]    CURRENT MEDICATIONS       Current Outpatient Medications:   •  amLODIPine (NORVASC) 5 mg tablet, Take 2 tablets (10 mg total) by mouth daily, Disp: 180 tablet, Rfl: 3  •  atorvastatin (LIPITOR) 10 mg tablet, Take 10 mg by mouth daily, Disp: , Rfl:   •  lisinopril (ZESTRIL) 40 mg tablet, Take 40 mg by mouth daily, Disp: , Rfl:   •  methimazole (TAPAZOLE) 5 mg tablet, TAKE 1 TABLET BY MOUTH TWICE A DAY, Disp: 180 tablet, Rfl: 0  •  spironolactone (ALDACTONE) 25 mg tablet, Take 1 1/2 tablet daily, Disp: 180 tablet, Rfl: 3      PHYSICAL EXAMINATION     Physical Exam  Constitutional:       Appearance: He is obese  HENT:      Head: Normocephalic and atraumatic  Eyes:      Pupils: Pupils are equal, round, and reactive to light  Neck:      Vascular: No JVD  Cardiovascular:      Rate and Rhythm: Normal rate and regular rhythm  Heart sounds: Normal heart sounds  No murmur heard  No friction rub  Pulmonary:      Effort: Pulmonary effort is normal       Breath sounds: Normal breath sounds  Abdominal:      General: Bowel sounds are normal  There is no distension  Palpations: Abdomen is soft  Tenderness: There is no abdominal tenderness  There is no rebound  Musculoskeletal:         General: No tenderness  Cervical back: Neck supple  Skin:     General: Skin is dry  Findings: No rash  Neurological:      Mental Status: He is alert and oriented to person, place, and time  LAB RESULTS     Results from last 6 Months   Lab Units 03/01/23  0859 12/05/22  1004   BUN mg/dL 26* 23   CALCIUM mg/dL 9 4  9 4 9 3   CHLORIDE mmol/L 105 107   CO2 mmol/L 24 24   CREATININE mg/dL 1 84* 1 85*   HEMATOCRIT  % 40 2  --    HEMOGLOBIN  g/dL 13 7  --    POTASSIUM mmol/L 4 6 4 5   PLATELETS  Thousand/uL 201  --    WHITE BLOOD CELL COUNT   Thousand/uL 6 4  --              RADIOLOGY RESULTS      Renal US:    Simple cyst in kidney (read by me)      ASSESSMENT/PLAN     58 M with PMH of hypertension, proteinuria, obesity who presents to renal clinic for f/u    1) Chronic Kidney Disease stage IIIb: Etiology likely hypertensive nephrosclerosis and age related nephron loss  Serological work up has been negative  Labs obtained revealing creatinine 1 8 and stable compared to December labs  Recommended increasing fluid intake up to 64 ounces daily  Renal US without any hydronephrosis  2) Hypertension: BP was elevated upon arrival however repeat measurement revealed 138/78 mmHg and acceptable  Patient is on amlodipine 5 mg twice daily, lisinopril 40 mg daily and spironolactone 25 mg 1-1/2 tablet daily  3) Proteinuria: Protein quantification had revealed 0 8 grams which is an improvement  Increase dose of spironolactone to 1-1/2 tablet daily  SPEP negative for paraprotein  4) Obesity: BMI is 32 and worse  Recommended enrolling in weight loss program      #5 low vitamin D: Recommended over-the-counter vitamin D3 1 tablet daily    6  Hyperkalemia: Given patient is on 2 medications which can raise serum potassium, I have recommended low potassium diet      7   Anemia due to CKD: Hemoglobin is within acceptable range            [facility]  Nephrology  6/1/2023

## 2023-06-01 NOTE — PROGRESS NOTES
NEPHROLOGY PROGRESS NOTE    Patient: Debbie Bates               Sex: male          DOA: No admission date for patient encounter  YOB: 1960        Age:  61 y o          6/1/2023        BACKGROUND     58 y o  M with PMH of CKD III, hypertension, CKD III, hyperthyroidism who presents to renal clinic    SUBJECTIVE     Patient presents after 6 months  He returned from [facility] with his family  Admits to having gained weight further  Denies any lower extremity edema, difficulty voiding shortness of breath or chest pain  Checks blood pressure at home occasionally  REVIEW OF SYSTEMS     Review of Systems   Constitutional: Negative  HENT: Negative  Eyes: Negative  Respiratory: Negative  Cardiovascular: Negative  Gastrointestinal: Negative  Endocrine: Negative  Genitourinary: Negative  Musculoskeletal: Negative  Skin: Negative  Allergic/Immunologic: Negative  Neurological: Negative  Hematological: Negative  All other systems reviewed and are negative  OBJECTIVE     Current Weight: Weight - Scale: 85 5 kg (188 lb 6 4 oz)  Vitals:    06/01/23 1331   BP: 152/80   Pulse: 76   Resp: 16   Temp: 98 °F (36 7 °C)   SpO2: 98%     Body mass index is 32 34 kg/m²  [unfilled]    CURRENT MEDICATIONS       Current Outpatient Medications:   •  amLODIPine (NORVASC) 5 mg tablet, Take 2 tablets (10 mg total) by mouth daily, Disp: 180 tablet, Rfl: 3  •  atorvastatin (LIPITOR) 10 mg tablet, Take 10 mg by mouth daily, Disp: , Rfl:   •  lisinopril (ZESTRIL) 40 mg tablet, Take 40 mg by mouth daily, Disp: , Rfl:   •  methimazole (TAPAZOLE) 5 mg tablet, TAKE 1 TABLET BY MOUTH TWICE A DAY, Disp: 180 tablet, Rfl: 0  •  spironolactone (ALDACTONE) 25 mg tablet, Take 1 1/2 tablet daily, Disp: 180 tablet, Rfl: 3      PHYSICAL EXAMINATION     Physical Exam  Constitutional:       Appearance: He is obese  HENT:      Head: Normocephalic and atraumatic     Eyes:      Pupils: Pupils are equal, round, and reactive to light  Neck:      Vascular: No JVD  Cardiovascular:      Rate and Rhythm: Normal rate and regular rhythm  Heart sounds: Normal heart sounds  No murmur heard  No friction rub  Pulmonary:      Effort: Pulmonary effort is normal       Breath sounds: Normal breath sounds  Abdominal:      General: Bowel sounds are normal  There is no distension  Palpations: Abdomen is soft  Tenderness: There is no abdominal tenderness  There is no rebound  Musculoskeletal:         General: No tenderness  Cervical back: Neck supple  Skin:     General: Skin is dry  Findings: No rash  Neurological:      Mental Status: He is alert and oriented to person, place, and time  LAB RESULTS     Results from last 6 Months   Lab Units 03/01/23  0859 12/05/22  1004   BUN mg/dL 26* 23   CALCIUM mg/dL 9 4  9 4 9 3   CHLORIDE mmol/L 105 107   CO2 mmol/L 24 24   CREATININE mg/dL 1 84* 1 85*   HEMATOCRIT  % 40 2  --    HEMOGLOBIN  g/dL 13 7  --    POTASSIUM mmol/L 4 6 4 5   PLATELETS  Thousand/uL 201  --    WHITE BLOOD CELL COUNT  Thousand/uL 6 4  --              RADIOLOGY RESULTS      Renal US:    Simple cyst in kidney (read by me)      ASSESSMENT/PLAN     58 M with PMH of hypertension, proteinuria, obesity who presents to renal clinic for f/u    1) Chronic Kidney Disease stage IIIb: Etiology likely hypertensive nephrosclerosis and age related nephron loss  Serological work up has been negative  Labs obtained revealing creatinine 1 8 and stable compared to December labs  Recommended increasing fluid intake up to 64 ounces daily  Renal US without any hydronephrosis  2) Hypertension: BP was elevated upon arrival however repeat measurement revealed 138/78 mmHg and acceptable  Patient is on amlodipine 5 mg twice daily, lisinopril 40 mg daily and spironolactone 25 mg 1-1/2 tablet daily      3) Proteinuria: Protein quantification had revealed 0 8 grams which is an improvement  Increase dose of spironolactone to 1-1/2 tablet daily  SPEP negative for paraprotein  4) Obesity: BMI is 32 and worse  Recommended enrolling in weight loss program      #5 low vitamin D: Recommended over-the-counter vitamin D3 1 tablet daily    6  Hyperkalemia: Given patient is on 2 medications which can raise serum potassium, I have recommended low potassium diet      7   Anemia due to CKD: Hemoglobin is within acceptable range            [facility]  Nephrology  6/1/2023

## 2023-06-02 ENCOUNTER — TELEPHONE (OUTPATIENT)
Dept: NEPHROLOGY | Facility: CLINIC | Age: 63
End: 2023-06-02

## 2023-06-02 LAB
25(OH)D3 SERPL-MCNC: 28 NG/ML (ref 30–100)
ALBUMIN SERPL-MCNC: 4.1 G/DL (ref 3.6–5.1)
ALBUMIN/GLOB SERPL: 1.5 (CALC) (ref 1–2.5)
ALP SERPL-CCNC: 81 U/L (ref 35–144)
ALT SERPL-CCNC: 16 U/L (ref 9–46)
AST SERPL-CCNC: 17 U/L (ref 10–35)
BACTERIA UR QL AUTO: ABNORMAL /HPF
BASOPHILS # BLD AUTO: 52 CELLS/UL (ref 0–200)
BASOPHILS NFR BLD AUTO: 1 %
BILIRUB SERPL-MCNC: 0.5 MG/DL (ref 0.2–1.2)
BUN SERPL-MCNC: 39 MG/DL (ref 7–25)
BUN/CREAT SERPL: 19 (CALC) (ref 6–22)
CALCIUM SERPL-MCNC: 8.9 MG/DL (ref 8.6–10.3)
CALCIUM SERPL-MCNC: 8.9 MG/DL (ref 8.6–10.3)
CHLORIDE SERPL-SCNC: 109 MMOL/L (ref 98–110)
CO2 SERPL-SCNC: 22 MMOL/L (ref 20–32)
CREAT SERPL-MCNC: 2.01 MG/DL (ref 0.7–1.35)
CREAT UR-MCNC: 127 MG/DL (ref 20–320)
EOSINOPHIL # BLD AUTO: 192 CELLS/UL (ref 15–500)
EOSINOPHIL NFR BLD AUTO: 3.7 %
ERYTHROCYTE [DISTWIDTH] IN BLOOD BY AUTOMATED COUNT: 12.2 % (ref 11–15)
GFR/BSA.PRED SERPLBLD CYS-BASED-ARV: 37 ML/MIN/1.73M2
GLOBULIN SER CALC-MCNC: 2.8 G/DL (CALC) (ref 1.9–3.7)
GLUCOSE SERPL-MCNC: 101 MG/DL (ref 65–99)
HCT VFR BLD AUTO: 38.2 % (ref 38.5–50)
HGB BLD-MCNC: 13.1 G/DL (ref 13.2–17.1)
HYALINE CASTS #/AREA URNS LPF: ABNORMAL /LPF
LYMPHOCYTES # BLD AUTO: 1862 CELLS/UL (ref 850–3900)
LYMPHOCYTES NFR BLD AUTO: 35.8 %
MCH RBC QN AUTO: 30.1 PG (ref 27–33)
MCHC RBC AUTO-ENTMCNC: 34.3 G/DL (ref 32–36)
MCV RBC AUTO: 87.8 FL (ref 80–100)
MONOCYTES # BLD AUTO: 421 CELLS/UL (ref 200–950)
MONOCYTES NFR BLD AUTO: 8.1 %
NEUTROPHILS # BLD AUTO: 2673 CELLS/UL (ref 1500–7800)
NEUTROPHILS NFR BLD AUTO: 51.4 %
PHOSPHATE SERPL-MCNC: 3.6 MG/DL (ref 2.5–4.5)
PLATELET # BLD AUTO: 190 THOUSAND/UL (ref 140–400)
PMV BLD REES-ECKER: 10.8 FL (ref 7.5–12.5)
POTASSIUM SERPL-SCNC: 4.7 MMOL/L (ref 3.5–5.3)
PROT SERPL-MCNC: 6.9 G/DL (ref 6.1–8.1)
PROT UR-MCNC: 80 MG/DL (ref 5–25)
PROT/CREAT UR: 0.63 MG/MG CREAT (ref 0.03–0.15)
PROT/CREAT UR: 630 MG/G CREAT (ref 25–148)
PTH-INTACT SERPL-MCNC: 67 PG/ML (ref 16–77)
RBC # BLD AUTO: 4.35 MILLION/UL (ref 4.2–5.8)
RBC #/AREA URNS HPF: ABNORMAL /HPF
SODIUM SERPL-SCNC: 138 MMOL/L (ref 135–146)
SQUAMOUS #/AREA URNS HPF: ABNORMAL /HPF
WBC # BLD AUTO: 5.2 THOUSAND/UL (ref 3.8–10.8)
WBC #/AREA URNS HPF: ABNORMAL /HPF

## 2023-06-02 NOTE — TELEPHONE ENCOUNTER
Called spoke with patient's spouse May Aguila advised her as per Dr George  patient's labs did show improvement in Urine protein amount of 600 mg and down from 800 mg  also advised patient's creatinine is up to 2 0 up from 1 8 and is likely due to dehydration  per Dr George cells in urinalysis suggestive of volume depletion he recommends patient start 1L water intake now with target fluid intake of > 64 oz daily  patient's spouse understood and is okay with it

## 2023-06-02 NOTE — TELEPHONE ENCOUNTER
----- Message from Bettye De La Vega MD sent at 6/2/2023  3:06 PM EDT -----  Please call the patient and inform that he did labs which shows improvement in Urine protein amount of 600 mg down from 800 mg  His creatinine is up to 2 0 up from 1 8 and likely due to dehydration as we saw cells in urinalysis suggestive of volume depletion  Recommend 1 L water intake now with target fluid intake of > 64 oz daily    Dr Sanabria

## 2023-06-30 DIAGNOSIS — E05.90 THYROTOXICOSIS WITHOUT THYROID STORM, UNSPECIFIED THYROTOXICOSIS TYPE: ICD-10-CM

## 2023-06-30 RX ORDER — METHIMAZOLE 5 MG/1
TABLET ORAL
Qty: 180 TABLET | Refills: 0 | Status: SHIPPED | OUTPATIENT
Start: 2023-06-30

## 2023-08-25 LAB
25(OH)D3 SERPL-MCNC: 35 NG/ML (ref 30–100)
ALBUMIN SERPL-MCNC: 4.3 G/DL (ref 3.6–5.1)
ALBUMIN/GLOB SERPL: 1.5 (CALC) (ref 1–2.5)
ALP SERPL-CCNC: 74 U/L (ref 35–144)
ALT SERPL-CCNC: 23 U/L (ref 9–46)
APPEARANCE UR: CLEAR
AST SERPL-CCNC: 18 U/L (ref 10–35)
BACTERIA UR QL AUTO: ABNORMAL /HPF
BASOPHILS # BLD AUTO: 41 CELLS/UL (ref 0–200)
BASOPHILS NFR BLD AUTO: 0.7 %
BILIRUB SERPL-MCNC: 0.3 MG/DL (ref 0.2–1.2)
BILIRUB UR QL STRIP: NEGATIVE
BUN SERPL-MCNC: 31 MG/DL (ref 7–25)
BUN/CREAT SERPL: 16 (CALC) (ref 6–22)
CALCIUM SERPL-MCNC: 9 MG/DL (ref 8.6–10.3)
CALCIUM SERPL-MCNC: 9 MG/DL (ref 8.6–10.3)
CHLORIDE SERPL-SCNC: 109 MMOL/L (ref 98–110)
CO2 SERPL-SCNC: 23 MMOL/L (ref 20–32)
COLOR UR: YELLOW
CREAT SERPL-MCNC: 1.95 MG/DL (ref 0.7–1.35)
CREAT UR-MCNC: 151 MG/DL (ref 20–320)
EOSINOPHIL # BLD AUTO: 218 CELLS/UL (ref 15–500)
EOSINOPHIL NFR BLD AUTO: 3.7 %
ERYTHROCYTE [DISTWIDTH] IN BLOOD BY AUTOMATED COUNT: 12.5 % (ref 11–15)
GFR/BSA.PRED SERPLBLD CYS-BASED-ARV: 38 ML/MIN/1.73M2
GLOBULIN SER CALC-MCNC: 2.8 G/DL (CALC) (ref 1.9–3.7)
GLUCOSE SERPL-MCNC: 108 MG/DL (ref 65–99)
GLUCOSE UR QL STRIP: NEGATIVE
HCT VFR BLD AUTO: 38.5 % (ref 38.5–50)
HGB BLD-MCNC: 12.7 G/DL (ref 13.2–17.1)
HGB UR QL STRIP: NEGATIVE
HYALINE CASTS #/AREA URNS LPF: ABNORMAL /LPF
KETONES UR QL STRIP: NEGATIVE
LEUKOCYTE ESTERASE UR QL STRIP: NEGATIVE
LYMPHOCYTES # BLD AUTO: 2207 CELLS/UL (ref 850–3900)
LYMPHOCYTES NFR BLD AUTO: 37.4 %
MCH RBC QN AUTO: 30.2 PG (ref 27–33)
MCHC RBC AUTO-ENTMCNC: 33 G/DL (ref 32–36)
MCV RBC AUTO: 91.4 FL (ref 80–100)
MONOCYTES # BLD AUTO: 507 CELLS/UL (ref 200–950)
MONOCYTES NFR BLD AUTO: 8.6 %
NEUTROPHILS # BLD AUTO: 2926 CELLS/UL (ref 1500–7800)
NEUTROPHILS NFR BLD AUTO: 49.6 %
NITRITE UR QL STRIP: NEGATIVE
PH UR STRIP: ABNORMAL [PH] (ref 5–8)
PHOSPHATE SERPL-MCNC: 3.5 MG/DL (ref 2.5–4.5)
PLATELET # BLD AUTO: 214 THOUSAND/UL (ref 140–400)
PMV BLD REES-ECKER: 11 FL (ref 7.5–12.5)
POTASSIUM SERPL-SCNC: 4.4 MMOL/L (ref 3.5–5.3)
PROT SERPL-MCNC: 7.1 G/DL (ref 6.1–8.1)
PROT UR QL STRIP: ABNORMAL
PROT UR-MCNC: 84 MG/DL (ref 5–25)
PROT/CREAT UR: 0.56 MG/MG CREAT (ref 0.03–0.15)
PROT/CREAT UR: 556 MG/G CREAT (ref 25–148)
PTH-INTACT SERPL-MCNC: 60 PG/ML (ref 16–77)
RBC # BLD AUTO: 4.21 MILLION/UL (ref 4.2–5.8)
RBC #/AREA URNS HPF: ABNORMAL /HPF
SODIUM SERPL-SCNC: 140 MMOL/L (ref 135–146)
SP GR UR STRIP: 1.02 (ref 1–1.03)
SQUAMOUS #/AREA URNS HPF: ABNORMAL /HPF
WBC # BLD AUTO: 5.9 THOUSAND/UL (ref 3.8–10.8)
WBC #/AREA URNS HPF: ABNORMAL /HPF

## 2023-08-31 ENCOUNTER — OFFICE VISIT (OUTPATIENT)
Dept: ENDOCRINOLOGY | Facility: CLINIC | Age: 63
End: 2023-08-31
Payer: COMMERCIAL

## 2023-08-31 VITALS
SYSTOLIC BLOOD PRESSURE: 130 MMHG | WEIGHT: 184.2 LBS | HEIGHT: 64 IN | HEART RATE: 61 BPM | DIASTOLIC BLOOD PRESSURE: 90 MMHG | BODY MASS INDEX: 31.45 KG/M2

## 2023-08-31 DIAGNOSIS — E05.90 HYPERTHYROIDISM: Primary | ICD-10-CM

## 2023-08-31 DIAGNOSIS — R79.89 LOW TESTOSTERONE IN MALE: ICD-10-CM

## 2023-08-31 DIAGNOSIS — E05.90 THYROTOXICOSIS WITHOUT THYROID STORM, UNSPECIFIED THYROTOXICOSIS TYPE: ICD-10-CM

## 2023-08-31 PROCEDURE — 99214 OFFICE O/P EST MOD 30 MIN: CPT | Performed by: INTERNAL MEDICINE

## 2023-08-31 RX ORDER — METHIMAZOLE 5 MG/1
5 TABLET ORAL 2 TIMES DAILY
Qty: 180 TABLET | Refills: 3 | Status: SHIPPED | OUTPATIENT
Start: 2023-08-31

## 2023-08-31 NOTE — PROGRESS NOTES
8/31/2023    Assessment/Plan      Diagnoses and all orders for this visit:    Hyperthyroidism  -     CBC and differential- Lab Collect  -     TSH, 3rd generation  -     T3, free  -     T4, free  -     Thyroid stimulating immunoglobulin    Low testosterone in male  -     Testosterone, free, total Lab Collect  -     Luteinizing hormone Lab Collect  -     Follicle stimulating hormone Lab Collect  -     Prolactin Lab Collect  -     Sex Hormone Binding Globulin- Lab Collect  -     Comprehensive metabolic panel Lab Collect    Thyrotoxicosis without thyroid storm, unspecified thyrotoxicosis type  -     methimazole (TAPAZOLE) 5 mg tablet; Take 1 tablet (5 mg total) by mouth 2 (two) times a day        Assessment/Plan:  1. Hyperthyroidism: We will update thyroid labs and call with the results and adjust regimen as needed. We will be in touch as results are available. Arrange follow-up in about 6 months but we will make adjustments as needed between appointments. 2.  Low testosterone: Last year he had a slightly low total testosterone. We will repeat a fasting testosterone along with CMP in 6: Binding globulin, LH, FSH, prolactin. CC: Follow-up    History of Present Illness     HPI: Luis Enrique Dawkins is a 61y.o. year old male who presents for a follow-up of subclinical hyperthyroidism. During initial work-up, thyroid antibodies were negative. There was not increased uptake seen on thyroid scan. Ultrasound of the thyroid showed scattered colloid cyst but no dominant nodules. He was initially treated with methimazole at a dose of 15 mg daily which was titrated to 10 mg daily at last appointment. He is currently on methimazole 5 mg bid. He presents today overall feeling well. No changes since last appointment. Still has challenges with weight loss. Review of Systems   Constitutional: Negative for fatigue. HENT: Negative for trouble swallowing and voice change. Eyes: Negative for visual disturbance. Respiratory: Negative for shortness of breath. Cardiovascular: Negative for palpitations and leg swelling. Gastrointestinal: Negative for abdominal pain, nausea and vomiting. Endocrine: Negative for polydipsia and polyuria. Musculoskeletal: Negative for arthralgias and myalgias. Skin: Negative for rash. Neurological: Negative for dizziness, tremors and weakness. Hematological: Negative for adenopathy. Psychiatric/Behavioral: Negative for agitation and confusion. Historical Information   Past Medical History:   Diagnosis Date   • Chronic kidney disease    • Hypertension    • Kidney stone      Past Surgical History:   Procedure Laterality Date   • TOE SURGERY Left 1982    Big toe     Social History   Social History     Substance and Sexual Activity   Alcohol Use Not Currently    Comment: Socially     Social History     Substance and Sexual Activity   Drug Use Never     Social History     Tobacco Use   Smoking Status Former   Smokeless Tobacco Never     Family History:   Family History   Problem Relation Age of Onset   • Hypertension Mother    • Kidney disease Mother    • Diabetes unspecified Mother         Past Aug 2005 kidney failure   • Heart disease Father    • Diabetes unspecified Father         Past 6/2295 heart complications   • Heart disease Brother    • Diabetes unspecified Brother         2015 had heart attack put 2 stents in       Meds/Allergies   Current Outpatient Medications   Medication Sig Dispense Refill   • amLODIPine (NORVASC) 5 mg tablet TAKE 2 TABLETS BY MOUTH EVERY  tablet 3   • atorvastatin (LIPITOR) 10 mg tablet Take 10 mg by mouth daily     • lisinopril (ZESTRIL) 40 mg tablet Take 40 mg by mouth daily     • methimazole (TAPAZOLE) 5 mg tablet Take 1 tablet (5 mg total) by mouth 2 (two) times a day 180 tablet 3   • spironolactone (ALDACTONE) 25 mg tablet Take 1 1/2 tablet daily 180 tablet 3     No current facility-administered medications for this visit. Allergies   Allergen Reactions   • Penicillins Hives       Objective   Vitals: Blood pressure 130/90, pulse 61, height 5' 4" (1.626 m), weight 83.6 kg (184 lb 3.2 oz). Invasive Devices     None                 Physical Exam  Vitals reviewed. Constitutional:       General: He is not in acute distress. Appearance: He is well-developed. He is not diaphoretic. HENT:      Head: Normocephalic and atraumatic. Eyes:      Conjunctiva/sclera: Conjunctivae normal.      Pupils: Pupils are equal, round, and reactive to light. Neck:      Thyroid: No thyromegaly. Cardiovascular:      Rate and Rhythm: Normal rate and regular rhythm. Pulmonary:      Effort: Pulmonary effort is normal. No respiratory distress. Breath sounds: Normal breath sounds. Abdominal:      General: Bowel sounds are normal.      Palpations: Abdomen is soft. Musculoskeletal:         General: Normal range of motion. Cervical back: Normal range of motion and neck supple. Skin:     General: Skin is warm and dry. Findings: No rash. Neurological:      Mental Status: He is alert and oriented to person, place, and time. Motor: No abnormal muscle tone. Psychiatric:         Behavior: Behavior normal.         The history was obtained from the review of the chart and from the patient. Lab Results:      3/1/23:  TSH 0.05, free t4 1.2    Future Appointments   Date Time Provider [address] [place] [address]   1/15/2024 10:00 AM Heena Hutson MD [place]       Portions of the record may have been created with voice recognition software. Occasional wrong word or "sound a like" substitutions may have occurred due to the inherent limitations of voice recognition software. Read the chart carefully and recognize, using context, where substitutions have occurred.

## 2023-10-10 DIAGNOSIS — N18.31 STAGE 3A CHRONIC KIDNEY DISEASE (HCC): Primary | ICD-10-CM

## 2023-10-12 ENCOUNTER — TELEPHONE (OUTPATIENT)
Dept: NEPHROLOGY | Facility: CLINIC | Age: 63
End: 2023-10-12

## 2023-10-12 NOTE — TELEPHONE ENCOUNTER
Patient had an appointment with Dr. Ivory Nash for 01/15/2023 that needs to be reschedule due to Dr. Ivory Nash will be on hospital call.  I called and spoke with patients wife an appointment was reschedule for 02/07/2024

## 2024-01-26 ENCOUNTER — TELEPHONE (OUTPATIENT)
Dept: NEPHROLOGY | Facility: CLINIC | Age: 64
End: 2024-01-26

## 2024-01-26 NOTE — TELEPHONE ENCOUNTER
I called [facility] @ [phone] and spoke to Anjali () to check to see if [facility] is participation. According to Anjali [facility] is Non-Par and Out of Network, but Dr. REJI George is In Network and is a participating provider. The reference number for this call from Anjali is: [id number]. I was then transfer to Adrienne () to check on  eligible for the patient and as of 1/26/2024 the patient has current active coverage as of 1/6/2007.  The reference number from Adrienne is: [id number]. Shelley Bradley, .

## 2024-02-04 ENCOUNTER — TELEPHONE (OUTPATIENT)
Dept: OTHER | Facility: OTHER | Age: 64
End: 2024-02-04

## 2024-02-04 NOTE — TELEPHONE ENCOUNTER
Pts wife called they have an appt with [facility] at 6:30 am on Monday 2/5/24. She is requesting the labs be faxed to [facility] in [place], she states as long as they received them sometime tomorrow they will do his blood work.

## 2024-02-06 ENCOUNTER — TELEPHONE (OUTPATIENT)
Dept: NEPHROLOGY | Facility: CLINIC | Age: 64
End: 2024-02-06

## 2024-02-06 LAB
25(OH)D3 SERPL-MCNC: 32 NG/ML (ref 30–100)
ALBUMIN SERPL-MCNC: 4.3 G/DL (ref 3.6–5.1)
ALBUMIN/GLOB SERPL: 1.4 (CALC) (ref 1–2.5)
ALP SERPL-CCNC: 70 U/L (ref 35–144)
ALT SERPL-CCNC: 24 U/L (ref 9–46)
APPEARANCE UR: CLEAR
AST SERPL-CCNC: 19 U/L (ref 10–35)
BACTERIA UR QL AUTO: ABNORMAL /HPF
BASOPHILS # BLD AUTO: 58 CELLS/UL (ref 0–200)
BASOPHILS NFR BLD AUTO: 0.9 %
BILIRUB SERPL-MCNC: 0.6 MG/DL (ref 0.2–1.2)
BILIRUB UR QL STRIP: NEGATIVE
BUN SERPL-MCNC: 27 MG/DL (ref 7–25)
BUN/CREAT SERPL: 14 (CALC) (ref 6–22)
CALCIUM SERPL-MCNC: 9.3 MG/DL (ref 8.6–10.3)
CALCIUM SERPL-MCNC: 9.3 MG/DL (ref 8.6–10.3)
CHLORIDE SERPL-SCNC: 106 MMOL/L (ref 98–110)
CO2 SERPL-SCNC: 26 MMOL/L (ref 20–32)
COLOR UR: YELLOW
CREAT SERPL-MCNC: 1.98 MG/DL (ref 0.7–1.35)
CREAT UR-MCNC: 46 MG/DL (ref 20–320)
EOSINOPHIL # BLD AUTO: 166 CELLS/UL (ref 15–500)
EOSINOPHIL NFR BLD AUTO: 2.6 %
ERYTHROCYTE [DISTWIDTH] IN BLOOD BY AUTOMATED COUNT: 12.4 % (ref 11–15)
GFR/BSA.PRED SERPLBLD CYS-BASED-ARV: 37 ML/MIN/1.73M2
GLOBULIN SER CALC-MCNC: 3 G/DL (CALC) (ref 1.9–3.7)
GLUCOSE SERPL-MCNC: 87 MG/DL (ref 65–99)
GLUCOSE UR QL STRIP: NEGATIVE
HCT VFR BLD AUTO: 37.9 % (ref 38.5–50)
HGB BLD-MCNC: 12.8 G/DL (ref 13.2–17.1)
HGB UR QL STRIP: NEGATIVE
HYALINE CASTS #/AREA URNS LPF: ABNORMAL /LPF
KETONES UR QL STRIP: NEGATIVE
LEUKOCYTE ESTERASE UR QL STRIP: NEGATIVE
LYMPHOCYTES # BLD AUTO: 2182 CELLS/UL (ref 850–3900)
LYMPHOCYTES NFR BLD AUTO: 34.1 %
MCH RBC QN AUTO: 30.8 PG (ref 27–33)
MCHC RBC AUTO-ENTMCNC: 33.8 G/DL (ref 32–36)
MCV RBC AUTO: 91.3 FL (ref 80–100)
MONOCYTES # BLD AUTO: 525 CELLS/UL (ref 200–950)
MONOCYTES NFR BLD AUTO: 8.2 %
NEUTROPHILS # BLD AUTO: 3469 CELLS/UL (ref 1500–7800)
NEUTROPHILS NFR BLD AUTO: 54.2 %
NITRITE UR QL STRIP: NEGATIVE
PH UR STRIP: 5.5 [PH] (ref 5–8)
PHOSPHATE SERPL-MCNC: 3.1 MG/DL (ref 2.5–4.5)
PLATELET # BLD AUTO: 236 THOUSAND/UL (ref 140–400)
PMV BLD REES-ECKER: 11 FL (ref 7.5–12.5)
POTASSIUM SERPL-SCNC: 4.5 MMOL/L (ref 3.5–5.3)
PROT SERPL-MCNC: 7.3 G/DL (ref 6.1–8.1)
PROT UR QL STRIP: ABNORMAL
PROT UR-MCNC: 51 MG/DL (ref 5–25)
PROT/CREAT UR: 1.11 MG/MG CREAT (ref 0.03–0.15)
PROT/CREAT UR: 1109 MG/G CREAT (ref 25–148)
PTH-INTACT SERPL-MCNC: 46 PG/ML (ref 16–77)
RBC # BLD AUTO: 4.15 MILLION/UL (ref 4.2–5.8)
RBC #/AREA URNS HPF: ABNORMAL /HPF
SODIUM SERPL-SCNC: 138 MMOL/L (ref 135–146)
SP GR UR STRIP: 1.01 (ref 1–1.03)
SQUAMOUS #/AREA URNS HPF: ABNORMAL /HPF
WBC # BLD AUTO: 6.4 THOUSAND/UL (ref 3.8–10.8)
WBC #/AREA URNS HPF: ABNORMAL /HPF

## 2024-02-07 ENCOUNTER — OFFICE VISIT (OUTPATIENT)
Dept: NEPHROLOGY | Facility: CLINIC | Age: 64
End: 2024-02-07
Payer: COMMERCIAL

## 2024-02-07 VITALS
SYSTOLIC BLOOD PRESSURE: 140 MMHG | BODY MASS INDEX: 32.1 KG/M2 | RESPIRATION RATE: 16 BRPM | HEIGHT: 64 IN | TEMPERATURE: 96.9 F | DIASTOLIC BLOOD PRESSURE: 70 MMHG | OXYGEN SATURATION: 97 % | HEART RATE: 61 BPM | WEIGHT: 188 LBS

## 2024-02-07 DIAGNOSIS — I10 PRIMARY HYPERTENSION: ICD-10-CM

## 2024-02-07 DIAGNOSIS — N18.32 STAGE 3B CHRONIC KIDNEY DISEASE (HCC): Primary | ICD-10-CM

## 2024-02-07 PROCEDURE — 99214 OFFICE O/P EST MOD 30 MIN: CPT | Performed by: INTERNAL MEDICINE

## 2024-02-07 RX ORDER — SPIRONOLACTONE 25 MG/1
25 TABLET ORAL DAILY
Qty: 180 TABLET | Refills: 3 | Status: SHIPPED | OUTPATIENT
Start: 2024-02-07

## 2024-02-07 NOTE — PROGRESS NOTES
NEPHROLOGY PROGRESS NOTE    Patient: Damien Brumfield               Sex: male          DOA: No admission date for patient encounter.   YOB: 1960        Age:  64 y.o.         2/7/2024        BACKGROUND     62 y.o. M with PMH of CKD III, hypertension, CKD III, hyperthyroidism who presents to renal clinic    SUBJECTIVE     Patient presents after 6 months.  Spent 3 months in [place] from November to January.  He has failed to lose further weight.  He has been seeing endocrinology for subclinical hyperthyroidism.    REVIEW OF SYSTEMS     Review of Systems   Constitutional: Negative.    HENT: Negative.     Eyes: Negative.    Respiratory: Negative.     Cardiovascular: Negative.    Gastrointestinal: Negative.    Endocrine: Negative.    Genitourinary: Negative.    Musculoskeletal: Negative.    Skin: Negative.    Allergic/Immunologic: Negative.    Neurological: Negative.    Hematological: Negative.    All other systems reviewed and are negative.      OBJECTIVE     Current Weight: Weight - Scale: 85.3 kg (188 lb)  Vitals:    02/07/24 1125   BP: 140/70   Pulse: 61   Resp: 16   Temp: (!) 96.9 °F (36.1 °C)   SpO2: 97%     Body mass index is 32.27 kg/m².  [unfilled]    CURRENT MEDICATIONS       Current Outpatient Medications:     amLODIPine (NORVASC) 5 mg tablet, TAKE 2 TABLETS BY MOUTH EVERY DAY, Disp: 180 tablet, Rfl: 3    atorvastatin (LIPITOR) 10 mg tablet, Take 10 mg by mouth daily, Disp: , Rfl:     lisinopril (ZESTRIL) 40 mg tablet, Take 40 mg by mouth daily, Disp: , Rfl:     methimazole (TAPAZOLE) 5 mg tablet, Take 1 tablet (5 mg total) by mouth 2 (two) times a day, Disp: 180 tablet, Rfl: 3    spironolactone (ALDACTONE) 25 mg tablet, Take 1 tablet (25 mg total) by mouth daily, Disp: 180 tablet, Rfl: 3      PHYSICAL EXAMINATION     Physical Exam  HENT:      Head: Normocephalic and atraumatic.   Eyes:      Pupils: Pupils are equal, round, and reactive to light.   Neck:      Vascular: No JVD.   Cardiovascular:       Rate and Rhythm: Normal rate and regular rhythm.      Heart sounds: Normal heart sounds. No murmur heard.     No friction rub.   Pulmonary:      Effort: Pulmonary effort is normal.      Breath sounds: Normal breath sounds.   Abdominal:      General: Bowel sounds are normal. There is no distension.      Palpations: Abdomen is soft.      Tenderness: There is no abdominal tenderness. There is no rebound.   Musculoskeletal:         General: No tenderness.      Cervical back: Neck supple.      Right lower leg: Edema present.      Left lower leg: Edema present.   Skin:     General: Skin is dry.      Findings: No rash.   Neurological:      Mental Status: He is alert and oriented to person, place, and time.           LAB RESULTS     Results from last 6 Months   Lab Units 02/05/24  0908 10/13/23  0609 08/24/23  0630   WHITE BLOOD CELL COUNT. Thousand/uL 6.4  --  5.9   HEMOGLOBIN. g/dL 12.8*  --  12.7*   HEMATOCRIT. % 37.9*  --  38.5   PLATELETS. Thousand/uL 236  --  214   POTASSIUM mmol/L 4.5 4.5 4.4   CHLORIDE mmol/L 106 108 109   CO2 mmol/L 26 23 23   BUN mg/dL 27* 29* 31*   CREATININE mg/dL 1.98* 1.81* 1.95*   CALCIUM mg/dL 9.3  9.3 9.1 9.0  9.0             RADIOLOGY RESULTS      Renal US:    Simple cyst in kidney (read by me)      ASSESSMENT/PLAN     62 M with PMH of hypertension, proteinuria, obesity who presents to renal clinic for f/u    1) Chronic Kidney Disease stage IIIb: Etiology likely hypertensive nephrosclerosis and age related nephron loss  Serological work up has been negative  Labs obtained revealing creatinine 1.9 milligrams per deciliter and within baseline  His baseline serum creatinine has ranged between 1.9-2.0  Renal US without any hydronephrosis.    2) Hypertension: Repeat blood pressure is 132/80.  On lisinopril 40 mg daily.  Increasing the dose of spironolactone to 25 mg p.o. twice daily    3) Proteinuria: Worsening proteinuria up to 1.1 g.    Etiology is likely hypertensive kidney disease and  obesity  Serological workup including paraproteinemia has been negative.  Remains on lisinopril and spironolactone as antiproteinuric agents    4) Obesity: BMI is 32 and elevated     Recommended enrolling in weight loss program.     #5 low vitamin D: 25-hydroxy vitamin D levels are 31 and normal.  Recommended patient to continue taking vitamin D3 supplement daily     6.  Hyperkalemia: Serum potassium is within normal range.      7.  Anemia due to CKD: Hemoglobin is within acceptable range and noted to be 12.8 g/dL            Dion George  Nephrology  2/7/2024

## 2024-02-27 ENCOUNTER — TELEPHONE (OUTPATIENT)
Dept: ENDOCRINOLOGY | Facility: CLINIC | Age: 64
End: 2024-02-27

## 2024-02-28 ENCOUNTER — TELEPHONE (OUTPATIENT)
Dept: ENDOCRINOLOGY | Facility: CLINIC | Age: 64
End: 2024-02-28

## 2024-02-28 NOTE — TELEPHONE ENCOUNTER
Called to see if there is any boold work that is needed for his upcoming appt 3-6-24 sent message to provider   Please Fax any labs to [phone]

## 2024-03-01 NOTE — TELEPHONE ENCOUNTER
Patient's wife called, requesting a callback from office to see if script for lab work was  faxed over to [email or web address] before Monday. Wife mentioned that she called a couple of times and left messages for office to fax over the order for order for blood work. A callback was requested to confirm order was faxed.     Fax number: [phone]

## 2024-03-04 ENCOUNTER — TELEPHONE (OUTPATIENT)
Dept: ENDOCRINOLOGY | Facility: CLINIC | Age: 64
End: 2024-03-04

## 2024-03-04 NOTE — TELEPHONE ENCOUNTER
Called PT to advise of which labs and then called back to advise the labs from Dr. Browne were faxed to [facility] [phone] and they went through ok

## 2024-03-04 NOTE — TELEPHONE ENCOUNTER
Contacted patient confirming they received fax of their blood work script. Patient verbalized that labs were received and completed.

## 2024-03-06 ENCOUNTER — OFFICE VISIT (OUTPATIENT)
Dept: ENDOCRINOLOGY | Facility: CLINIC | Age: 64
End: 2024-03-06
Payer: COMMERCIAL

## 2024-03-06 VITALS
SYSTOLIC BLOOD PRESSURE: 124 MMHG | HEART RATE: 60 BPM | BODY MASS INDEX: 32.03 KG/M2 | DIASTOLIC BLOOD PRESSURE: 78 MMHG | WEIGHT: 187.6 LBS | HEIGHT: 64 IN

## 2024-03-06 DIAGNOSIS — R79.89 LOW TESTOSTERONE: ICD-10-CM

## 2024-03-06 DIAGNOSIS — E05.90 HYPERTHYROIDISM: Primary | ICD-10-CM

## 2024-03-06 PROCEDURE — 99214 OFFICE O/P EST MOD 30 MIN: CPT | Performed by: PHYSICIAN ASSISTANT

## 2024-03-06 NOTE — PROGRESS NOTES
Established Patient Progress Note     CC: Endocrinology follow up visit    Impression & Plan:    Problem List Items Addressed This Visit        Endocrine    Hyperthyroidism - Primary     Awaiting results of recent labs.  Continue methimazole and will make adjustments to dose if needed.             Other    Low testosterone     Repeat lab testing pending.  He denies any symptoms of hypogonadism at this time.             History of Present Illness:     Damien Brumfield is a 64 y.o. male with a history of subclinical  hyperthyroidism diagnosed in 2021.  He Has had negative thyroid antibody testing.  Ultrasound of thyroid showed colloid cysts.  Uptake/scan did not show increased uptake.  He is currently taking methimazole 10mg daily.  Overall, he is feeling well but does report weight gain.  (Weight stable compared to 5/2022 endocrine visit).  He walks 30 minutes per day, healthy diet.     Repeat lab testing has been completed due to low testosterone level.  Results are not available.  He denies symptoms of ED, decreased libido, or hot flashes.  He shaves daily and frequency of shaving has not changed.      Patient Active Problem List   Diagnosis   • Hypertension   • Hyperthyroidism   • Low testosterone      Past Medical History:   Diagnosis Date   • Chronic kidney disease    • Hypertension    • Kidney stone       Past Surgical History:   Procedure Laterality Date   • TOE SURGERY Left 1982    Big toe      Family History   Problem Relation Age of Onset   • Hypertension Mother    • Kidney disease Mother    • Diabetes unspecified Mother         Past Aug 2005 kidney failure   • Heart disease Father    • Diabetes unspecified Father         Past 6/1967 heart complications   • Heart disease Brother    • Diabetes unspecified Brother         2015 had heart attack put 2 stents in     Social History     Tobacco Use   • Smoking status: Former   • Smokeless tobacco: Never   Substance Use Topics   • Alcohol use: Not Currently      "Comment: Socially     Allergies   Allergen Reactions   • Penicillins Hives       Current Outpatient Medications:   •  amLODIPine (NORVASC) 5 mg tablet, TAKE 2 TABLETS BY MOUTH EVERY DAY, Disp: 180 tablet, Rfl: 3  •  atorvastatin (LIPITOR) 10 mg tablet, Take 10 mg by mouth daily, Disp: , Rfl:   •  lisinopril (ZESTRIL) 40 mg tablet, Take 40 mg by mouth daily, Disp: , Rfl:   •  methimazole (TAPAZOLE) 5 mg tablet, Take 1 tablet (5 mg total) by mouth 2 (two) times a day, Disp: 180 tablet, Rfl: 3  •  spironolactone (ALDACTONE) 25 mg tablet, Take 1 tablet (25 mg total) by mouth daily, Disp: 180 tablet, Rfl: 3    Review of Systems   Constitutional:  Positive for unexpected weight change (gain). Negative for activity change, appetite change and fatigue.   HENT:  Negative for sore throat, trouble swallowing and voice change.    Eyes:  Negative for visual disturbance.   Respiratory:  Negative for choking, chest tightness and shortness of breath.    Cardiovascular:  Negative for chest pain, palpitations and leg swelling.   Gastrointestinal:  Negative for abdominal pain, constipation and diarrhea.   Endocrine: Negative for cold intolerance, heat intolerance, polydipsia, polyphagia and polyuria.   Genitourinary:  Negative for frequency.   Musculoskeletal:  Negative for arthralgias and myalgias.   Skin:  Negative for rash.   Neurological:  Negative for dizziness and syncope.   Hematological:  Negative for adenopathy.   Psychiatric/Behavioral:  Negative for sleep disturbance.    All other systems reviewed and are negative.      Physical Exam:  Body mass index is 32.2 kg/m².  /78   Pulse 60   Ht 5' 4\" (1.626 m)   Wt 85.1 kg (187 lb 9.6 oz)   BMI 32.20 kg/m²    Wt Readings from Last 3 Encounters:   03/06/24 85.1 kg (187 lb 9.6 oz)   02/07/24 85.3 kg (188 lb)   08/31/23 83.6 kg (184 lb 3.2 oz)       Physical Exam  Vitals reviewed.   Constitutional:       General: He is not in acute distress.     Appearance: He is " well-developed.   HENT:      Head: Normocephalic and atraumatic.   Eyes:      Conjunctiva/sclera: Conjunctivae normal.      Pupils: Pupils are equal, round, and reactive to light.   Neck:      Thyroid: No thyromegaly.   Cardiovascular:      Rate and Rhythm: Normal rate and regular rhythm.      Heart sounds: Normal heart sounds. No murmur heard.  Pulmonary:      Effort: Pulmonary effort is normal. No respiratory distress.      Breath sounds: Normal breath sounds. No wheezing or rales.   Abdominal:      General: Bowel sounds are normal. There is no distension.      Palpations: Abdomen is soft.      Tenderness: There is no abdominal tenderness.   Musculoskeletal:         General: Normal range of motion.      Cervical back: Normal range of motion and neck supple.   Lymphadenopathy:      Cervical: No cervical adenopathy.   Skin:     General: Skin is warm and dry.   Neurological:      Mental Status: He is alert and oriented to person, place, and time.         Labs:      3/1/23:  TSH 0.05, free t4 1.2    Discussed with the patient and all questioned fully answered. He will call me if any problems arise.    Follow-up appointment in 6 months.     Counseled patient on diagnostic results, prognosis, risk and benefit of treatment options, instruction for management, importance of treatment compliance, Risk  factor reduction and impressions    Jessie Lamas PA-C

## 2024-03-10 LAB
ALBUMIN SERPL-MCNC: 4.3 G/DL (ref 3.6–5.1)
ALBUMIN/GLOB SERPL: 1.4 (CALC) (ref 1–2.5)
ALP SERPL-CCNC: 73 U/L (ref 35–144)
ALT SERPL-CCNC: 27 U/L (ref 9–46)
AST SERPL-CCNC: 22 U/L (ref 10–35)
BASOPHILS # BLD AUTO: 37 CELLS/UL (ref 0–200)
BASOPHILS NFR BLD AUTO: 0.6 %
BILIRUB SERPL-MCNC: 0.5 MG/DL (ref 0.2–1.2)
BUN SERPL-MCNC: 35 MG/DL (ref 7–25)
BUN/CREAT SERPL: 16 (CALC) (ref 6–22)
CALCIUM SERPL-MCNC: 9.4 MG/DL (ref 8.6–10.3)
CHLORIDE SERPL-SCNC: 107 MMOL/L (ref 98–110)
CO2 SERPL-SCNC: 21 MMOL/L (ref 20–32)
CREAT SERPL-MCNC: 2.15 MG/DL (ref 0.7–1.35)
EOSINOPHIL # BLD AUTO: 254 CELLS/UL (ref 15–500)
EOSINOPHIL NFR BLD AUTO: 4.1 %
ERYTHROCYTE [DISTWIDTH] IN BLOOD BY AUTOMATED COUNT: 12 % (ref 11–15)
FSH SERPL-ACNC: 4 MIU/ML (ref 1.4–12.8)
GFR/BSA.PRED SERPLBLD CYS-BASED-ARV: 34 ML/MIN/1.73M2
GLOBULIN SER CALC-MCNC: 3 G/DL (CALC) (ref 1.9–3.7)
GLUCOSE SERPL-MCNC: 96 MG/DL (ref 65–99)
HCT VFR BLD AUTO: 38.5 % (ref 38.5–50)
HGB BLD-MCNC: 12.7 G/DL (ref 13.2–17.1)
LH SERPL-ACNC: 3.3 MIU/ML (ref 1.6–15.2)
LYMPHOCYTES # BLD AUTO: 1841 CELLS/UL (ref 850–3900)
LYMPHOCYTES NFR BLD AUTO: 29.7 %
MCH RBC QN AUTO: 30.3 PG (ref 27–33)
MCHC RBC AUTO-ENTMCNC: 33 G/DL (ref 32–36)
MCV RBC AUTO: 91.9 FL (ref 80–100)
MONOCYTES # BLD AUTO: 502 CELLS/UL (ref 200–950)
MONOCYTES NFR BLD AUTO: 8.1 %
NEUTROPHILS # BLD AUTO: 3565 CELLS/UL (ref 1500–7800)
NEUTROPHILS NFR BLD AUTO: 57.5 %
PLATELET # BLD AUTO: 197 THOUSAND/UL (ref 140–400)
PMV BLD REES-ECKER: 11 FL (ref 7.5–12.5)
POTASSIUM SERPL-SCNC: 4.9 MMOL/L (ref 3.5–5.3)
PROLACTIN SERPL-MCNC: 4.5 NG/ML (ref 2–18)
PROT SERPL-MCNC: 7.3 G/DL (ref 6.1–8.1)
RBC # BLD AUTO: 4.19 MILLION/UL (ref 4.2–5.8)
SHBG SERPL-SCNC: 19 NMOL/L (ref 22–77)
SODIUM SERPL-SCNC: 138 MMOL/L (ref 135–146)
T3FREE SERPL-MCNC: 3.7 PG/ML (ref 2.3–4.2)
T4 FREE SERPL-MCNC: 1.2 NG/DL (ref 0.8–1.8)
TESTOST FREE SERPL-MCNC: 49.1 PG/ML (ref 35–155)
TESTOST SERPL-MCNC: 210 NG/DL (ref 250–1100)
TSH SERPL-ACNC: 0.13 MIU/L (ref 0.4–4.5)
TSI SER-ACNC: <89 % BASELINE
WBC # BLD AUTO: 6.2 THOUSAND/UL (ref 3.8–10.8)

## 2024-03-19 ENCOUNTER — TELEPHONE (OUTPATIENT)
Age: 64
End: 2024-03-19

## 2024-03-19 NOTE — TELEPHONE ENCOUNTER
Patient's spouse is calling looking for lab test results.   Please call back as soon as possible so they can move forward with his appointments.

## 2024-03-20 DIAGNOSIS — E05.90 HYPERTHYROIDISM: Primary | ICD-10-CM

## 2024-03-20 NOTE — TELEPHONE ENCOUNTER
Sorry for delay  Labs reviewed  See results note. No change needed.   Ordered labs to be done before next visit in September.

## 2024-03-21 ENCOUNTER — TELEPHONE (OUTPATIENT)
Dept: ENDOCRINOLOGY | Facility: CLINIC | Age: 64
End: 2024-03-21

## 2024-03-21 NOTE — TELEPHONE ENCOUNTER
----- Message from Jessie Lamas PA-C sent at 3/20/2024 11:29 AM EDT -----  Lab testing is stable.  TSH low but T4/T3 normal and he is reporting no symptoms of hyperthyroidism but has had weight gain, so will not make methimazole increase at that time.   Continue same dose of methimazole.  Will order repeat testing to be done before next visit.   Free Testosterone normal/no symptoms of hypogonadism.

## 2024-04-30 ENCOUNTER — TELEPHONE (OUTPATIENT)
Dept: NEPHROLOGY | Facility: CLINIC | Age: 64
End: 2024-04-30

## 2024-04-30 DIAGNOSIS — R80.9 ALBUMINURIA: Primary | ICD-10-CM

## 2024-04-30 DIAGNOSIS — R79.89 ELEVATED SERUM CREATININE: ICD-10-CM

## 2024-04-30 LAB
25(OH)D3 SERPL-MCNC: 37 NG/ML (ref 30–100)
ALBUMIN SERPL-MCNC: 4.4 G/DL (ref 3.6–5.1)
ALBUMIN/GLOB SERPL: 1.5 (CALC) (ref 1–2.5)
ALP SERPL-CCNC: 67 U/L (ref 35–144)
ALT SERPL-CCNC: 26 U/L (ref 9–46)
APPEARANCE UR: CLEAR
AST SERPL-CCNC: 17 U/L (ref 10–35)
BASOPHILS # BLD AUTO: 52 CELLS/UL (ref 0–200)
BASOPHILS NFR BLD AUTO: 0.9 %
BILIRUB SERPL-MCNC: 0.5 MG/DL (ref 0.2–1.2)
BILIRUB UR QL STRIP: NEGATIVE
BUN SERPL-MCNC: 38 MG/DL (ref 7–25)
BUN/CREAT SERPL: 16 (CALC) (ref 6–22)
CALCIUM SERPL-MCNC: 9.5 MG/DL (ref 8.6–10.3)
CALCIUM SERPL-MCNC: 9.5 MG/DL (ref 8.6–10.3)
CHLORIDE SERPL-SCNC: 106 MMOL/L (ref 98–110)
CO2 SERPL-SCNC: 23 MMOL/L (ref 20–32)
COLOR UR: YELLOW
CREAT SERPL-MCNC: 2.32 MG/DL (ref 0.7–1.35)
CREAT UR-MCNC: 103 MG/DL (ref 20–320)
EOSINOPHIL # BLD AUTO: 220 CELLS/UL (ref 15–500)
EOSINOPHIL NFR BLD AUTO: 3.8 %
ERYTHROCYTE [DISTWIDTH] IN BLOOD BY AUTOMATED COUNT: 11.7 % (ref 11–15)
GFR/BSA.PRED SERPLBLD CYS-BASED-ARV: 31 ML/MIN/1.73M2
GLOBULIN SER CALC-MCNC: 2.9 G/DL (CALC) (ref 1.9–3.7)
GLUCOSE SERPL-MCNC: 102 MG/DL (ref 65–99)
GLUCOSE UR QL STRIP: NEGATIVE
HCT VFR BLD AUTO: 38 % (ref 38.5–50)
HGB BLD-MCNC: 12.9 G/DL (ref 13.2–17.1)
HGB UR QL STRIP: NEGATIVE
KETONES UR QL STRIP: NEGATIVE
LEUKOCYTE ESTERASE UR QL STRIP: NEGATIVE
LYMPHOCYTES # BLD AUTO: 1879 CELLS/UL (ref 850–3900)
LYMPHOCYTES NFR BLD AUTO: 32.4 %
MCH RBC QN AUTO: 30.4 PG (ref 27–33)
MCHC RBC AUTO-ENTMCNC: 33.9 G/DL (ref 32–36)
MCV RBC AUTO: 89.6 FL (ref 80–100)
MONOCYTES # BLD AUTO: 464 CELLS/UL (ref 200–950)
MONOCYTES NFR BLD AUTO: 8 %
NEUTROPHILS # BLD AUTO: 3184 CELLS/UL (ref 1500–7800)
NEUTROPHILS NFR BLD AUTO: 54.9 %
NITRITE UR QL STRIP: NEGATIVE
PH UR STRIP: 5.5 [PH] (ref 5–8)
PHOSPHATE SERPL-MCNC: 3.4 MG/DL (ref 2.5–4.5)
PLATELET # BLD AUTO: 210 THOUSAND/UL (ref 140–400)
PMV BLD REES-ECKER: 10.4 FL (ref 7.5–12.5)
POTASSIUM SERPL-SCNC: 4.9 MMOL/L (ref 3.5–5.3)
PROT SERPL-MCNC: 7.3 G/DL (ref 6.1–8.1)
PROT UR QL STRIP: ABNORMAL
PROT UR-MCNC: 28 MG/DL (ref 5–25)
PROT/CREAT UR: 0.27 MG/MG CREAT (ref 0.03–0.15)
PROT/CREAT UR: 272 MG/G CREAT (ref 25–148)
PTH-INTACT SERPL-MCNC: 41 PG/ML (ref 16–77)
RBC # BLD AUTO: 4.24 MILLION/UL (ref 4.2–5.8)
SODIUM SERPL-SCNC: 138 MMOL/L (ref 135–146)
SP GR UR STRIP: 1.01 (ref 1–1.03)
WBC # BLD AUTO: 5.8 THOUSAND/UL (ref 3.8–10.8)

## 2024-04-30 NOTE — TELEPHONE ENCOUNTER
Called patient and ended up leaving a message stating that creatinine is slightly up to 2.3 however its due to proteinuria being significantly down to 250 mg from 1100 mg which is great. Told to cut back on spironolactone to 1 tablet 25 mg daily. Repeat urine microalbumin creatinine ratio test in 2 months along with BMP in 2 months.  Dr. George

## 2024-07-02 DIAGNOSIS — I10 PRIMARY HYPERTENSION: ICD-10-CM

## 2024-07-02 RX ORDER — AMLODIPINE BESYLATE 5 MG/1
TABLET ORAL
Qty: 180 TABLET | Refills: 1 | Status: SHIPPED | OUTPATIENT
Start: 2024-07-02

## 2024-09-03 ENCOUNTER — TELEPHONE (OUTPATIENT)
Dept: NEPHROLOGY | Facility: CLINIC | Age: 64
End: 2024-09-03

## 2024-09-03 NOTE — TELEPHONE ENCOUNTER
Called left voice message to remind patient to get non-fasting labs done 1 week prior to 09/16/24 scheduled follow-up appointment with Dr.Adeem George. also advised as a reminder If labs / testing are not done in the appropriate time for scheduled appointment, appointment may need to be rescheduled.

## 2024-09-04 ENCOUNTER — TELEPHONE (OUTPATIENT)
Age: 64
End: 2024-09-04

## 2024-09-29 DIAGNOSIS — E05.90 THYROTOXICOSIS WITHOUT THYROID STORM, UNSPECIFIED THYROTOXICOSIS TYPE: ICD-10-CM

## 2024-09-30 RX ORDER — METHIMAZOLE 5 MG/1
5 TABLET ORAL 2 TIMES DAILY
Qty: 180 TABLET | Refills: 3 | Status: SHIPPED | OUTPATIENT
Start: 2024-09-30

## 2024-12-12 ENCOUNTER — OFFICE VISIT (OUTPATIENT)
Dept: ENDOCRINOLOGY | Facility: CLINIC | Age: 64
End: 2024-12-12
Payer: COMMERCIAL

## 2024-12-12 VITALS
SYSTOLIC BLOOD PRESSURE: 122 MMHG | WEIGHT: 191 LBS | BODY MASS INDEX: 32.61 KG/M2 | HEIGHT: 64 IN | DIASTOLIC BLOOD PRESSURE: 76 MMHG

## 2024-12-12 DIAGNOSIS — E05.90 HYPERTHYROIDISM: Primary | ICD-10-CM

## 2024-12-12 DIAGNOSIS — R79.89 LOW TESTOSTERONE: ICD-10-CM

## 2024-12-12 PROCEDURE — 99214 OFFICE O/P EST MOD 30 MIN: CPT | Performed by: INTERNAL MEDICINE

## 2024-12-12 NOTE — PROGRESS NOTES
12/12/2024    Assessment & Plan      Diagnoses and all orders for this visit:    Hyperthyroidism  -     Testosterone, free, total  -     Sex Hormone Binding Globulin  -     Comprehensive metabolic panel  -     TSH, 3rd generation  -     T3, free  -     T4, free  -     CBC and differential  -     TSH with HAMA Treatment    Low testosterone  -     Testosterone, free, total  -     Sex Hormone Binding Globulin  -     Comprehensive metabolic panel  -     TSH, 3rd generation  -     T3, free  -     T4, free  -     CBC and differential  -     TSH with HAMA Treatment        Assessment/Plan:  1.  Subclinical hyperthyroidism: For now we will continue current regimen and update labs.  Will contact him as results are available.    2.  Low testosterone: We will update labs.  We also discussed the possible connection between weight gain and low testosterone and low sex hormone binding globulin.  I asked that he consider a sleep evaluation in the future and we discussed the relation between the possibility of his testosterone labs and unintentional weight gain.      CC: Follow-up    History of Present Illness     HPI: Damien Brumfield is a 64 y.o. year old adult with history of subclinical hyperthyroidism diagnosed around 2021 who presents for a follow-up appointment.  He has had negative thyroid antibody testing.  Ultrasound of the thyroid showed colloid cyst.  Uptake and scan did not show increased uptake.  He continues on methimazole at a dose of 10 mg daily.  He presents today overall feeling well but still notes unintentional weight gain despite lifestyle efforts.    Review of Systems   Constitutional:  Positive for unexpected weight change. Negative for fatigue.   HENT:  Negative for trouble swallowing and voice change.    Eyes:  Negative for visual disturbance.   Respiratory:  Negative for shortness of breath.    Cardiovascular:  Negative for palpitations and leg swelling.   Gastrointestinal:  Negative for abdominal pain, nausea  "and vomiting.   Endocrine: Negative for polydipsia and polyuria.   Musculoskeletal:  Negative for arthralgias and myalgias.   Skin:  Negative for rash.   Neurological:  Negative for dizziness, tremors and weakness.   Hematological:  Negative for adenopathy.   Psychiatric/Behavioral:  Negative for agitation and confusion.        Historical Information   Past Medical History:   Diagnosis Date    Chronic kidney disease     Hypertension     Kidney stone      Past Surgical History:   Procedure Laterality Date    TOE SURGERY Left 1982    Big toe     Social History   Social History     Substance and Sexual Activity   Alcohol Use Not Currently    Comment: Socially     Social History     Substance and Sexual Activity   Drug Use Never     Social History     Tobacco Use   Smoking Status Former   Smokeless Tobacco Never     Family History:   Family History   Problem Relation Age of Onset    Hypertension Mother     Kidney disease Mother     Diabetes unspecified Mother         Past Aug 2005 kidney failure    Heart disease Father     Diabetes unspecified Father         Past 6/1967 heart complications    Heart disease Brother     Diabetes unspecified Brother         2015 had heart attack put 2 stents in       Meds/Allergies   Current Outpatient Medications   Medication Sig Dispense Refill    amLODIPine (NORVASC) 5 mg tablet TAKE 2 TABLETS BY MOUTH EVERY  tablet 1    atorvastatin (LIPITOR) 10 mg tablet Take 10 mg by mouth daily      lisinopril (ZESTRIL) 40 mg tablet Take 40 mg by mouth daily      methimazole (TAPAZOLE) 5 mg tablet TAKE 1 TABLET BY MOUTH TWICE A  tablet 3    spironolactone (ALDACTONE) 25 mg tablet Take 1 tablet (25 mg total) by mouth daily 180 tablet 3     No current facility-administered medications for this visit.     Allergies   Allergen Reactions    Penicillins Hives       Objective   Vitals: Blood pressure 122/76, height 5' 4\" (1.626 m), weight 86.6 kg (191 lb).  Invasive Devices       None        " "           Physical Exam  Vitals reviewed.   Constitutional:       General: He is not in acute distress.     Appearance: He is well-developed. He is not diaphoretic.   HENT:      Head: Normocephalic and atraumatic.   Eyes:      Conjunctiva/sclera: Conjunctivae normal.      Pupils: Pupils are equal, round, and reactive to light.   Neck:      Thyroid: No thyromegaly.   Cardiovascular:      Rate and Rhythm: Normal rate and regular rhythm.   Pulmonary:      Effort: Pulmonary effort is normal. No respiratory distress.      Breath sounds: Normal breath sounds.   Abdominal:      General: Bowel sounds are normal.      Palpations: Abdomen is soft.   Musculoskeletal:         General: Normal range of motion.      Cervical back: Normal range of motion and neck supple.   Skin:     General: Skin is warm and dry.      Findings: No rash.   Neurological:      Mental Status: He is alert and oriented to person, place, and time.      Motor: No abnormal muscle tone.   Psychiatric:         Behavior: Behavior normal.         The history was obtained from the review of the chart and from the patient.    Lab Results:      Labs from 7/23/2024:  TSH 0.02, free T4 1.4      Future Appointments   Date Time Provider Department Center   3/12/2025 11:30 AM Dion George MD [facility]       Portions of the record may have been created with voice recognition software. Occasional wrong word or \"sound a like\" substitutions may have occurred due to the inherent limitations of voice recognition software. Read the chart carefully and recognize, using context, where substitutions have occurred.    "

## 2024-12-24 ENCOUNTER — TELEPHONE (OUTPATIENT)
Dept: NEPHROLOGY | Facility: CLINIC | Age: 64
End: 2024-12-24

## 2024-12-24 NOTE — TELEPHONE ENCOUNTER
I called and left a message on machine for patient to return our call about rescheduling his 3/12/2025 6 month nephrology follow up with Dr. REJI Negro because of Dr. REJI George not in the office.

## 2024-12-25 DIAGNOSIS — I10 PRIMARY HYPERTENSION: ICD-10-CM

## 2024-12-26 ENCOUNTER — TELEPHONE (OUTPATIENT)
Dept: NEPHROLOGY | Facility: CLINIC | Age: 64
End: 2024-12-26

## 2024-12-26 RX ORDER — AMLODIPINE BESYLATE 5 MG/1
10 TABLET ORAL DAILY
Qty: 180 TABLET | Refills: 1 | Status: SHIPPED | OUTPATIENT
Start: 2024-12-26

## 2024-12-26 NOTE — TELEPHONE ENCOUNTER
I called and left a message on patient wife Onorinda cell phone for patient to return our call about rescheduling his 3/12/2025 6 month nephrology follow up appointment with Dr. REJI George because of Dr. REJI George not in the office. At this time the patient appointment has been cancel and a letter was mailed out to the patient just as a reminder to return our call so we can help him reschedule his appointment.

## 2024-12-27 ENCOUNTER — TELEPHONE (OUTPATIENT)
Age: 64
End: 2024-12-27

## 2024-12-27 NOTE — TELEPHONE ENCOUNTER
Pt has been rescheduled for 03/18 with Allison. Please mail out labs for pt to get done prior to appt. Thank you!

## 2025-03-06 ENCOUNTER — TELEPHONE (OUTPATIENT)
Dept: NEPHROLOGY | Facility: CLINIC | Age: 65
End: 2025-03-06

## 2025-03-11 LAB
ALBUMIN/CREAT UR: 1011 MG/G CREAT
BUN SERPL-MCNC: 29 MG/DL (ref 7–25)
BUN/CREAT SERPL: 13 (CALC) (ref 6–22)
CALCIUM SERPL-MCNC: 9.3 MG/DL (ref 8.6–10.3)
CHLORIDE SERPL-SCNC: 107 MMOL/L (ref 98–110)
CO2 SERPL-SCNC: 26 MMOL/L (ref 20–32)
CREAT SERPL-MCNC: 2.26 MG/DL (ref 0.7–1.35)
CREAT UR-MCNC: 199 MG/DL (ref 20–320)
GFR/BSA.PRED SERPLBLD CYS-BASED-ARV: 31 ML/MIN/1.73M2
GLUCOSE SERPL-MCNC: 94 MG/DL (ref 65–99)
MICROALBUMIN UR-MCNC: 201.2 MG/DL
POTASSIUM SERPL-SCNC: 5.1 MMOL/L (ref 3.5–5.3)
SODIUM SERPL-SCNC: 141 MMOL/L (ref 135–146)

## 2025-03-17 PROBLEM — N18.32 STAGE 3B CHRONIC KIDNEY DISEASE (HCC): Status: ACTIVE | Noted: 2025-03-17

## 2025-03-17 PROBLEM — R80.1 PERSISTENT PROTEINURIA: Status: ACTIVE | Noted: 2025-03-17

## 2025-03-17 NOTE — PROGRESS NOTES
NEPHROLOGY OFFICE NOTE    Patient: Damien Brumfield               Sex: adult           YOB: 1960        Age:  65 y.o.       3/18/2025    ASSESSMENT/PLAN     Assessment & Plan  Stage 3b chronic kidney disease (HCC)  Lab Results   Component Value Date    EGFR 31 (L) 03/11/2025    EGFR 31 (L) 03/11/2025    EGFR 36 (L) 07/23/2024    CREATININE 2.26 (H) 03/11/2025    CREATININE 2.3 (H) 03/11/2025    CREATININE 2.01 (H) 07/23/2024       Orders:    CBC and differential; Future    Basic metabolic panel; Future    Urinalysis with microscopic; Future    Albumin / creatinine urine ratio; Future    Phosphorus; Future    Uric acid; Future    PTH, intact; Future    Vitamin D 25 hydroxy; Future    Ambulatory Referral to Interventional Radiology; Future    After review of the medical record, it appears baseline creatinine level fluctuates from 2.0-2.3.  Etiology multifactorial suspected secondary to underlying hypertensive nephrosclerosis, age-related nephron loss.    Prior renal ultrasound on 2/21/2022 noted several simple cysts in the left kidney and right kidney, no evidence of hydronephrosis or urinary retention.    Most recent creatinine level was 2.26 with an EGFR of 31.  Management of proteinuria as below.    As he clinically appears euvolemic advise importance of staying hydrated and avoidance of nephrotoxic agents such as NSAIDs.  Will recheck CKD labs prior to follow-up.  Primary hypertension    Orders:    Basic metabolic panel; Future    Urinalysis with microscopic; Future    Albumin / creatinine urine ratio; Future    Currently maintained on spironolactone 25 mg daily, amlodipine 5 mg daily, and lisinopril 40 mg daily.  Blood pressure within acceptable range on the current regimen.  He reports he is actively monitoring at home and blood pressures have been averaging within acceptable range.  Recommend he continue monitoring home blood pressure.  Persistent proteinuria    Orders:    Urinalysis with  microscopic; Future    Albumin / creatinine urine ratio; Future    Ambulatory Referral to Interventional Radiology; Future    Etiology suspected to be secondary to hypertensive nephrosclerosis and obesity.  Previous serological workup in the past by Dr. George including paraproteinemia has been negative.    He is actively taken spironolactone 25 mg twice daily and lisinopril 40 mg daily.  Most recent urine microalbumin to creatinine ratio elevated at 1 g.  Unfortunately, patient was unable to tolerate higher dose of spironolactone due to worsening renal parameters.    Case was discussed with patient's attending nephrologist and after discussion with patient advised that we can consider renal biopsy for further evaluation of proteinuria.  After discussion with the patient and his wife, he would like to proceed with renal biopsy for definitive evaluation.    Referral placed to  and will utilize [facility].    BACKGROUND     I had the pleasure of seeing Itzel Brumfield in the nephrology office today for follow-up.  He has a past medical history significant for hypertension, CKD stage III, and hypothyroidism.    He is currently following with our office and Dr. LUIS Caal for management of underlying chronic kidney disease, last seen on 2/7/2024.  Around that time, creatinine level was fluctuating around 2.0 - 2.2.    He is actively following with endocrinology for management of underlying hyperthyroidism.  He is maintained on methimazole.    He has a history of hypertension and is currently maintained on lisinopril, amlodipine, and spironolactone.  Reports he is actively monitoring his blood pressure at home and is getting excellent readings, most recently 117 mm systolic.    He presents with his life for follow-up today.  Overall frustrated regarding the persistent level of protein in his urine as he has been closely following his diet and monitoring his blood pressure.    The last blood work was done on 3/11/1015, which  we have reviewed together.    SUBJECTIVE     He currently has no major complaints at this time from above HPI.    REVIEW OF SYSTEMS     Review of Systems   Constitutional:  Negative for activity change and fever.   HENT:  Negative for trouble swallowing.    Respiratory:  Negative for shortness of breath.    Cardiovascular:  Negative for leg swelling.   Gastrointestinal:  Negative for nausea and vomiting.   Genitourinary:  Negative for difficulty urinating, dysuria, frequency and hematuria.   Musculoskeletal:  Negative for back pain.   Skin:  Negative for pallor.   Neurological:  Negative for dizziness, syncope, weakness and light-headedness.   Psychiatric/Behavioral:  Negative for sleep disturbance. The patient is not nervous/anxious.        OBJECTIVE     Current Weight: Weight - Scale: 84.8 kg (187 lb)  Vitals:    03/18/25 1605   BP: 138/82   Pulse: 76   Resp: 16   Temp: 97.7 °F (36.5 °C)   SpO2: 98%     Body mass index is 32.1 kg/m².    CURRENT MEDICATIONS       Current Outpatient Medications:     amLODIPine (NORVASC) 5 mg tablet, TAKE 2 TABLETS BY MOUTH EVERY DAY, Disp: 180 tablet, Rfl: 1    atorvastatin (LIPITOR) 10 mg tablet, Take 10 mg by mouth daily, Disp: , Rfl:     lisinopril (ZESTRIL) 40 mg tablet, Take 40 mg by mouth daily, Disp: , Rfl:     methimazole (TAPAZOLE) 5 mg tablet, TAKE 1 TABLET BY MOUTH TWICE A DAY, Disp: 180 tablet, Rfl: 3    spironolactone (ALDACTONE) 25 mg tablet, Take 1 tablet (25 mg total) by mouth daily, Disp: 180 tablet, Rfl: 3      PAST MEDICAL HISTORY     Past Medical History:   Diagnosis Date    Chronic kidney disease     Hypertension     Kidney stone        PAST SURGICAL HISTORY     Past Surgical History:   Procedure Laterality Date    TOE SURGERY Left 1982    Big toe       ALLERGIES     Allergies   Allergen Reactions    Penicillins Hives       SOCIAL HISTORY     Social History     Substance and Sexual Activity   Alcohol Use Not Currently    Comment: Socially     Social History      Substance and Sexual Activity   Drug Use Never     Social History     Tobacco Use   Smoking Status Former   Smokeless Tobacco Never       FAMILY HISTORY     Family History   Problem Relation Age of Onset    Hypertension Mother     Kidney disease Mother     Diabetes unspecified Mother         Past Aug 2005 kidney failure    Heart disease Father     Diabetes unspecified Father         Past 6/1967 heart complications    Heart disease Brother     Diabetes unspecified Brother         2015 had heart attack put 2 stents in       PHYSICAL EXAMINATION     Physical Exam  Vitals reviewed.   Constitutional:       General: He is not in acute distress.  HENT:      Head: Normocephalic.      Mouth/Throat:      Lips: Pink.      Mouth: Mucous membranes are moist.   Eyes:      General: Lids are normal. No scleral icterus.  Cardiovascular:      Rate and Rhythm: Normal rate and regular rhythm.      Heart sounds: S1 normal and S2 normal. No murmur heard.  Pulmonary:      Effort: Pulmonary effort is normal. No accessory muscle usage or respiratory distress.      Breath sounds: Normal breath sounds.   Abdominal:      General: There is no distension.      Tenderness: There is no abdominal tenderness.   Musculoskeletal:      Cervical back: Normal range of motion and neck supple. No tenderness.      Right lower leg: No edema.      Left lower leg: No edema.   Skin:     General: Skin is warm.      Coloration: Skin is not cyanotic or jaundiced.   Neurological:      General: No focal deficit present.      Mental Status: He is alert and oriented to person, place, and time.   Psychiatric:         Attention and Perception: Attention normal.         Speech: Speech normal.         Behavior: Behavior is cooperative.           LAB RESULTS     [facility] 3/11/2025:  Sodium 141  Potassium 5.1  Chloride 107  CO2 26  BUN 29  Creatinine 2.26  Calcium 9.3  eGFR 31      RADIOLOGY RESULTS      Ultrasound kidney and bladder 2/21/2022:  FINDINGS:    "  KIDNEYS:  Symmetric and normal size.  Right kidney:  11.8 x 4.9 x 4.6 cm. Volume 140.7 mL  Left kidney:  10.4 x 4.9 x 4.8 cm.  Volume 129.4 mL     Right kidney  Normal echogenicity and contour.   No mass is identified.   Several simple appearing cortical cysts measuring up to 2.9 x 3 x 3 cm in the lower pole.   No hydronephrosis.  No shadowing calculi.  No perinephric fluid collections.     Left kidney  Normal echogenicity and contour.   No mass is identified.   Several simple appearing cortical cysts measuring up to 1.4 x 1.2 x 1.5 cm in the lower pole.   No hydronephrosis.  No shadowing calculi.  No perinephric fluid collections.     URETERS:  Nonvisualized.     BLADDER:   Normally distended.  No focal thickening or mass lesions.  Bilateral ureteral jets detected.        IMPRESSION:  1. Bilateral simple appearing renal cysts. No acute findings.      Recommend Nephrology follow-up in 6 months.    RACHID Kurtz  Nephrology  3/18/2025      Portions of the record may have been created with voice recognition software. Occasional wrong word or \"sound a like\" substitutions may have occurred due to the inherent limitations of voice recognition software. Read the chart carefully and recognize, using context, where substitutions have occurred.   "

## 2025-03-18 ENCOUNTER — OFFICE VISIT (OUTPATIENT)
Dept: NEPHROLOGY | Facility: CLINIC | Age: 65
End: 2025-03-18
Payer: MEDICARE

## 2025-03-18 VITALS
BODY MASS INDEX: 31.92 KG/M2 | WEIGHT: 187 LBS | SYSTOLIC BLOOD PRESSURE: 138 MMHG | TEMPERATURE: 97.7 F | RESPIRATION RATE: 16 BRPM | DIASTOLIC BLOOD PRESSURE: 82 MMHG | OXYGEN SATURATION: 98 % | HEIGHT: 64 IN | HEART RATE: 76 BPM

## 2025-03-18 DIAGNOSIS — R80.1 PERSISTENT PROTEINURIA: ICD-10-CM

## 2025-03-18 DIAGNOSIS — N18.32 STAGE 3B CHRONIC KIDNEY DISEASE (HCC): Primary | ICD-10-CM

## 2025-03-18 DIAGNOSIS — I10 PRIMARY HYPERTENSION: ICD-10-CM

## 2025-03-18 PROCEDURE — 99214 OFFICE O/P EST MOD 30 MIN: CPT

## 2025-03-18 NOTE — ASSESSMENT & PLAN NOTE
Lab Results   Component Value Date    EGFR 31 (L) 03/11/2025    EGFR 31 (L) 03/11/2025    EGFR 36 (L) 07/23/2024    CREATININE 2.26 (H) 03/11/2025    CREATININE 2.3 (H) 03/11/2025    CREATININE 2.01 (H) 07/23/2024       Orders:    CBC and differential; Future    Basic metabolic panel; Future    Urinalysis with microscopic; Future    Albumin / creatinine urine ratio; Future    Phosphorus; Future    Uric acid; Future    PTH, intact; Future    Vitamin D 25 hydroxy; Future    Ambulatory Referral to Interventional Radiology; Future    After review of the medical record, it appears baseline creatinine level fluctuates from 2.0-2.3.  Etiology multifactorial suspected secondary to underlying hypertensive nephrosclerosis, age-related nephron loss.    Prior renal ultrasound on 2/21/2022 noted several simple cysts in the left kidney and right kidney, no evidence of hydronephrosis or urinary retention.    Most recent creatinine level was 2.26 with an EGFR of 31.  Management of proteinuria as below.    As he clinically appears euvolemic advise importance of staying hydrated and avoidance of nephrotoxic agents such as NSAIDs.  Will recheck CKD labs prior to follow-up.

## 2025-03-18 NOTE — ASSESSMENT & PLAN NOTE
Orders:    Basic metabolic panel; Future    Urinalysis with microscopic; Future    Albumin / creatinine urine ratio; Future    Currently maintained on spironolactone 25 mg daily, amlodipine 5 mg daily, and lisinopril 40 mg daily.  Blood pressure within acceptable range on the current regimen.  He reports he is actively monitoring at home and blood pressures have been averaging within acceptable range.  Recommend he continue monitoring home blood pressure.

## 2025-03-18 NOTE — LETTER
March 18, 2025     Margarita Carranza MD  [address] [facility] [address]    Patient: Damien Brumfield   YOB: 1960   Date of Visit: 3/18/2025       Dear Dr. Carranza:    Thank you for referring Damien Brumfield to me for evaluation. Below are my notes for this consultation.    If you have questions, please do not hesitate to call me. I look forward to following your patient along with you.         Sincerely,        RACHID Kurtz        CC: No Recipients    RACHID Kurtz  3/18/2025  4:57 PM  Sign when Signing Visit  NEPHROLOGY OFFICE NOTE    Patient: Damien Brumfield               Sex: adult           YOB: 1960        Age:  65 y.o.       3/18/2025    ASSESSMENT/PLAN     Assessment & Plan  Stage 3b chronic kidney disease (HCC)  Lab Results   Component Value Date    EGFR 31 (L) 03/11/2025    EGFR 31 (L) 03/11/2025    EGFR 36 (L) 07/23/2024    CREATININE 2.26 (H) 03/11/2025    CREATININE 2.3 (H) 03/11/2025    CREATININE 2.01 (H) 07/23/2024       Orders:  •  CBC and differential; Future  •  Basic metabolic panel; Future  •  Urinalysis with microscopic; Future  •  Albumin / creatinine urine ratio; Future  •  Phosphorus; Future  •  Uric acid; Future  •  PTH, intact; Future  •  Vitamin D 25 hydroxy; Future  •  Ambulatory Referral to Interventional Radiology; Future    After review of the medical record, it appears baseline creatinine level fluctuates from 2.0-2.3.  Etiology multifactorial suspected secondary to underlying hypertensive nephrosclerosis, age-related nephron loss.    Prior renal ultrasound on 2/21/2022 noted several simple cysts in the left kidney and right kidney, no evidence of hydronephrosis or urinary retention.    Most recent creatinine level was 2.26 with an EGFR of 31.  Management of proteinuria as below.    As he clinically appears euvolemic advise importance of staying hydrated and avoidance of nephrotoxic agents such as NSAIDs.  Will recheck  CKD labs prior to follow-up.  Primary hypertension    Orders:  •  Basic metabolic panel; Future  •  Urinalysis with microscopic; Future  •  Albumin / creatinine urine ratio; Future    Currently maintained on spironolactone 25 mg daily, amlodipine 5 mg daily, and lisinopril 40 mg daily.  Blood pressure within acceptable range on the current regimen.  He reports he is actively monitoring at home and blood pressures have been averaging within acceptable range.  Recommend he continue monitoring home blood pressure.  Persistent proteinuria    Orders:  •  Urinalysis with microscopic; Future  •  Albumin / creatinine urine ratio; Future  •  Ambulatory Referral to Interventional Radiology; Future    Etiology suspected to be secondary to hypertensive nephrosclerosis and obesity.  Previous serological workup in the past by Dr. George including paraproteinemia has been negative.    He is actively taken spironolactone 25 mg twice daily and lisinopril 40 mg daily.  Most recent urine microalbumin to creatinine ratio elevated at 1 g.  Unfortunately, patient was unable to tolerate higher dose of spironolactone due to worsening renal parameters.    Case was discussed with patient's attending nephrologist and after discussion with patient advised that we can consider renal biopsy for further evaluation of proteinuria.  After discussion with the patient and his wife, he would like to proceed with renal biopsy for definitive evaluation.    Referral placed to IR and will utilize [facility].    BACKGROUND     I had the pleasure of seeing Itzel Brumfield in the nephrology office today for follow-up.  He has a past medical history significant for hypertension, CKD stage III, and hypothyroidism.    He is currently following with our office and Dr. LUIS Caal for management of underlying chronic kidney disease, last seen on 2/7/2024.  Around that time, creatinine level was fluctuating around 2.0 - 2.2.    He is actively following with  endocrinology for management of underlying hyperthyroidism.  He is maintained on methimazole.    He has a history of hypertension and is currently maintained on lisinopril, amlodipine, and spironolactone.  Reports he is actively monitoring his blood pressure at home and is getting excellent readings, most recently 117 mm systolic.    He presents with his life for follow-up today.  Overall frustrated regarding the persistent level of protein in his urine as he has been closely following his diet and monitoring his blood pressure.    The last blood work was done on 3/11/1015, which we have reviewed together.    SUBJECTIVE     He currently has no major complaints at this time from above HPI.    REVIEW OF SYSTEMS     Review of Systems   Constitutional:  Negative for activity change and fever.   HENT:  Negative for trouble swallowing.    Respiratory:  Negative for shortness of breath.    Cardiovascular:  Negative for leg swelling.   Gastrointestinal:  Negative for nausea and vomiting.   Genitourinary:  Negative for difficulty urinating, dysuria, frequency and hematuria.   Musculoskeletal:  Negative for back pain.   Skin:  Negative for pallor.   Neurological:  Negative for dizziness, syncope, weakness and light-headedness.   Psychiatric/Behavioral:  Negative for sleep disturbance. The patient is not nervous/anxious.        OBJECTIVE     Current Weight: Weight - Scale: 84.8 kg (187 lb)  Vitals:    03/18/25 1605   BP: 138/82   Pulse: 76   Resp: 16   Temp: 97.7 °F (36.5 °C)   SpO2: 98%     Body mass index is 32.1 kg/m².    CURRENT MEDICATIONS       Current Outpatient Medications:   •  amLODIPine (NORVASC) 5 mg tablet, TAKE 2 TABLETS BY MOUTH EVERY DAY, Disp: 180 tablet, Rfl: 1  •  atorvastatin (LIPITOR) 10 mg tablet, Take 10 mg by mouth daily, Disp: , Rfl:   •  lisinopril (ZESTRIL) 40 mg tablet, Take 40 mg by mouth daily, Disp: , Rfl:   •  methimazole (TAPAZOLE) 5 mg tablet, TAKE 1 TABLET BY MOUTH TWICE A DAY, Disp: 180  tablet, Rfl: 3  •  spironolactone (ALDACTONE) 25 mg tablet, Take 1 tablet (25 mg total) by mouth daily, Disp: 180 tablet, Rfl: 3      PAST MEDICAL HISTORY     Past Medical History:   Diagnosis Date   • Chronic kidney disease    • Hypertension    • Kidney stone        PAST SURGICAL HISTORY     Past Surgical History:   Procedure Laterality Date   • TOE SURGERY Left 1982    Big toe       ALLERGIES     Allergies   Allergen Reactions   • Penicillins Hives       SOCIAL HISTORY     Social History     Substance and Sexual Activity   Alcohol Use Not Currently    Comment: Socially     Social History     Substance and Sexual Activity   Drug Use Never     Social History     Tobacco Use   Smoking Status Former   Smokeless Tobacco Never       FAMILY HISTORY     Family History   Problem Relation Age of Onset   • Hypertension Mother    • Kidney disease Mother    • Diabetes unspecified Mother         Past Aug 2005 kidney failure   • Heart disease Father    • Diabetes unspecified Father         Past 6/1967 heart complications   • Heart disease Brother    • Diabetes unspecified Brother         2015 had heart attack put 2 stents in       PHYSICAL EXAMINATION     Physical Exam  Vitals reviewed.   Constitutional:       General: He is not in acute distress.  HENT:      Head: Normocephalic.      Mouth/Throat:      Lips: Pink.      Mouth: Mucous membranes are moist.   Eyes:      General: Lids are normal. No scleral icterus.  Cardiovascular:      Rate and Rhythm: Normal rate and regular rhythm.      Heart sounds: S1 normal and S2 normal. No murmur heard.  Pulmonary:      Effort: Pulmonary effort is normal. No accessory muscle usage or respiratory distress.      Breath sounds: Normal breath sounds.   Abdominal:      General: There is no distension.      Tenderness: There is no abdominal tenderness.   Musculoskeletal:      Cervical back: Normal range of motion and neck supple. No tenderness.      Right lower leg: No edema.      Left lower leg:  "No edema.   Skin:     General: Skin is warm.      Coloration: Skin is not cyanotic or jaundiced.   Neurological:      General: No focal deficit present.      Mental Status: He is alert and oriented to person, place, and time.   Psychiatric:         Attention and Perception: Attention normal.         Speech: Speech normal.         Behavior: Behavior is cooperative.           LAB RESULTS     [facility] 3/11/2025:  Sodium 141  Potassium 5.1  Chloride 107  CO2 26  BUN 29  Creatinine 2.26  Calcium 9.3  eGFR 31      RADIOLOGY RESULTS      Ultrasound kidney and bladder 2/21/2022:  FINDINGS:     KIDNEYS:  Symmetric and normal size.  Right kidney:  11.8 x 4.9 x 4.6 cm. Volume 140.7 mL  Left kidney:  10.4 x 4.9 x 4.8 cm.  Volume 129.4 mL     Right kidney  Normal echogenicity and contour.   No mass is identified.   Several simple appearing cortical cysts measuring up to 2.9 x 3 x 3 cm in the lower pole.   No hydronephrosis.  No shadowing calculi.  No perinephric fluid collections.     Left kidney  Normal echogenicity and contour.   No mass is identified.   Several simple appearing cortical cysts measuring up to 1.4 x 1.2 x 1.5 cm in the lower pole.   No hydronephrosis.  No shadowing calculi.  No perinephric fluid collections.     URETERS:  Nonvisualized.     BLADDER:   Normally distended.  No focal thickening or mass lesions.  Bilateral ureteral jets detected.        IMPRESSION:  1. Bilateral simple appearing renal cysts. No acute findings.      Recommend Nephrology follow-up in 6 months.    RACHID Kurtz  Nephrology  3/18/2025      Portions of the record may have been created with voice recognition software. Occasional wrong word or \"sound a like\" substitutions may have occurred due to the inherent limitations of voice recognition software. Read the chart carefully and recognize, using context, where substitutions have occurred.   "

## 2025-03-18 NOTE — PATIENT INSTRUCTIONS
"Drink plenty of water, up to 64 ounces daily  Avoid NSAIDs (e.g., Ibuprofen, Motrin, Aleve, Naproxen, Advil)  We will get you set up for a kidney biopsy.   Follow a lower potassium diet, printout-out below    Patient Education     Low-potassium diet   The Basics   Written by the doctors and editors at [facility]   What is potassium? -- Potassium is a mineral found in most foods. The body needs potassium to work normally. It keeps the heart beating and helps the nerves and muscles work. But people need only a certain amount of potassium. Too much or too little potassium in the body can cause problems.  Having too much potassium in the blood is called \"hyperkalemia.\" This can cause heart rhythm problems and muscle weakness.  Who might need to be on a low-potassium diet? -- People usually need to be on a low-potassium diet to treat or prevent hyperkalemia.  The most common causes of hyperkalemia are:   Certain medicines - Some medicines, including certain ones for high blood pressure and heart problems, might raise the level of potassium in the body.   Kidney disease - Normally, the kidneys filter blood and remove excess salt and water through urination (figure 1). They keep the level of potassium in the blood normal. When the kidneys don't work well or stop working, they can't get rid of the potassium in the urine. Then, too much potassium builds up in the blood.  Many people who get a treatment called \"dialysis\" for kidney disease need to be on a low-potassium diet. Dialysis is a treatment that takes over the job of the kidneys.  What does eating a low-potassium diet involve? -- Almost all foods have potassium. So the key is to:   Choose foods with low levels of potassium (table 1).   Avoid or eat only small amounts of foods with high levels of potassium (table 2).  Your doctor will probably recommend that you work with a dietitian (food expert) to help plan your meals. They will tell you how much potassium you should " "eat each day.  To figure out how much potassium you are eating, look at the food's nutrition label (figure 2). You need to look at the:   \"Potassium\" number - This tells you how much potassium is in 1 serving of the food. If you eat 1 serving, then you are eating this amount of potassium.   \"Serving size\" - This tells you how big a serving is. If you eat 2 servings, then you are eating 2 times the amount of potassium listed.  What are other ways to cut down on potassium? -- Here are some other ways to cut down on potassium:   Drain the liquid from canned fruits, vegetables, or meats before eating.   If you eat foods that have a lot of potassium, eat only small portions.   Reduce the amount of potassium in the vegetables you eat. You can do this for both frozen and raw vegetables. (If the vegetables are raw, peel and cut them up first.) To reduce the amount of potassium, soak the vegetables in warm unsalted water for at least 2 hours. Then, drain the water and rinse the vegetables in warm water. If you cook the vegetables, cook them in unsalted water.  All topics are updated as new evidence becomes available and our peer review process is complete.  This topic retrieved from [employer] on: Feb 26, 2024.  Topic 61592 Version 8.0  Release: 32.2.4 - C32.56  © 2024 UpToDate, Inc. and/or its affiliates. All rights reserved.  figure 1: Anatomy of the urinary tract     Urine is made by the kidneys. It passes from the kidneys into the bladder through 2 tubes called the ureters. Then, it leaves the bladder through another tube called the urethra.  Graphic 76707 Version 8.0  table 1: Some foods that are low in potassium  Fruits  Vegetables  Proteins    Apple juice  Apples and applesauce  Blackberries  Blueberries  Cherries  Cranberries  Grapefruit  Grapes and grape juice  Peaches  Pears  Pineapple  Plums  Raspberries  Strawberries  Watermelon Alfalfa sprouts  Asparagus  Cabbage (cooked)  Carrots " (cooked)  Cauliflower  Celery  Corn  Cucumber  Eggplant  Green beans  Green peas  Green peppers  Kale  Lettuce  Okra  Onions  Radish  Rhubarb  Spinach  Water chestnuts  Wax beans  Yellow squash  Zucchini Almonds  Cashews  Chicken  Eggs  Flax seed  Peanuts  Pumpkin seeds  Shrimp  Jerome seeds  Tuna  Turkey  Walnuts   Graphic 00720 Version 3.0  table 2: Some foods that are high in potassium  Fruits  Vegetables  Proteins  Other    Avocado  Bananas  Coconut  Cantaloupe and honeydew melons  Dates  Dried fruits  Figs  Kiwi  Kd  Nectarines  Oranges and orange juice  Prunes and prune juice  Raisins Artichokes  Baked beans  Beets  Broccoli  Gatzke sprouts  Cabbage (raw)  Carrots (raw)  Chard  Olives  Potatoes (white and sweet)  Pickles  Pumpkin  [place]  Squash (acorn, butternut, horton)  Tomatoes and tomato juice Black beans  Clams  Ground beef  Kidney beans  Lobster  Navy beans  Phelps beans  Albert Lea  Sardines  Scallops  Steak  [place] Chocolate  Dairy products  Granola  Milk  Peanut butter  Soups that are salt-free or low-sodium  Soy milk  Sports drinks  Tomato sauce  Wheat bran and bran products  Whole-grain bread  Yogurt   The foods on this list are high in potassium. People who need to follow a low-potassium diet should avoid or limit these foods.  Graphic 19999 Version 3.0  figure 2: Potassium on food labels     To figure out how much potassium you are eating, check the label to find out how much potassium is in 1 serving. If you are having more than 1 serving, multiply the amount of potassium by the number of servings you plan to eat. For instance, this whole can of soup has 2 servings. If you are going to eat the whole can, you should multiply 450 by 2. That means you will be having 900 milligrams (mg) of potassium.  Graphic 96744 Version 5.0  Consumer Information Use and Disclaimer   Disclaimer: This generalized information is a limited summary of diagnosis, treatment, and/or medication information. It is  not meant to be comprehensive and should be used as a tool to help the user understand and/or assess potential diagnostic and treatment options. It does NOT include all information about conditions, treatments, medications, side effects, or risks that may apply to a specific patient. It is not intended to be medical advice or a substitute for the medical advice, diagnosis, or treatment of a health care provider based on the health care provider's examination and assessment of a patient's specific and unique circumstances. Patients must speak with a health care provider for complete information about their health, medical questions, and treatment options, including any risks or benefits regarding use of medications. This information does not endorse any treatments or medications as safe, effective, or approved for treating a specific patient. UpToDate, Inc. and its affiliates disclaim any warranty or liability relating to this information or the use thereof.The use of this information is governed by the Terms of Use, available at https://www.achvrtersOrganizeruwer.com/en/know/clinical-effectiveness-terms. 2024© UpToDate, Inc. and its affiliates and/or licensors. All rights reserved.  Copyright   © 2024 UpToDate, Inc. and/or its affiliates. All rights reserved.

## 2025-03-19 ENCOUNTER — PREP FOR PROCEDURE (OUTPATIENT)
Dept: INTERVENTIONAL RADIOLOGY/VASCULAR | Facility: CLINIC | Age: 65
End: 2025-03-19

## 2025-03-19 DIAGNOSIS — N18.32 STAGE 3B CHRONIC KIDNEY DISEASE (HCC): Primary | ICD-10-CM

## 2025-03-25 DIAGNOSIS — I10 PRIMARY HYPERTENSION: ICD-10-CM

## 2025-03-25 RX ORDER — AMLODIPINE BESYLATE 10 MG/1
10 TABLET ORAL DAILY
Qty: 90 TABLET | Refills: 1 | Status: SHIPPED | OUTPATIENT
Start: 2025-03-25

## 2025-03-25 RX ORDER — AMLODIPINE BESYLATE 5 MG/1
10 TABLET ORAL DAILY
Qty: 180 TABLET | Refills: 1 | OUTPATIENT
Start: 2025-03-25

## 2025-03-25 RX ORDER — SPIRONOLACTONE 25 MG/1
25 TABLET ORAL DAILY
Qty: 90 TABLET | Refills: 1 | Status: SHIPPED | OUTPATIENT
Start: 2025-03-25

## 2025-04-07 NOTE — PRE-PROCEDURE INSTRUCTIONS
Pre-procedure Instructions for Interventional Radiology  [address] [address]  INTERVENTIONAL RADIOLOGY [phone]    You are scheduled for a/an kidney biopsy.    On Monday 4-14-25.    Your tentative arrival time is 9am.  Short stay will notify you the day before your procedure with the exact arrival time and the location to arrive.    To prepare for your procedure:  Please arrange for someone to drive you home after the procedure and stay with you until the next morning if you are instructed to do so.  This is typically for patients receiving some type of sedative or anesthetic for the procedure.  DO NOT EAT OR DRINK ANYTHING after midnight on the evening before your procedure including candy & gum.  ONLY SIPS OF WATER with your medications are allowed on the morning of your procedure.  TAKE ALL OF YOUR REGULAR MEDICATIONS THE MORNING OF YOUR PROCEDURE with sips of water!  We may call you to stop some of your blood sugar, blood pressure and blood thinning medications depending on the procedure.  Please take all of these medications unless we instruct you to stop them.  If you have an allergy to x-ray dye, please contact Interventional Radiology for an x-ray dye preparation which usually consists of an oral steroid and Benadryl.  If you wear a Glucose Monitor, you may be asked to remove it for your procedure if we are using x-ray.  These devices need to be removed when we are imaging with x-ray near the device since the radiation can cause the unit to malfunction.  If possible and not too inconvenient, you may want to schedule your exam closer to day 14 of your 14 day device so your device is not wasted.    The day of your procedure:  Bring a list of the medications you take at home.  Bring medications you take for breathing problems (such as inhalers), medications for chest pain, or both.  Bring a case for your glasses or contacts.  Bring your insurance card and a form  of photo ID.  Please leave all valuables such as credit cards and jewelry at home.  Report to the admitting office to the left of the registration desk in the main lobby at the [facility], Entrance B.  You will then be directed to the Short Stay Center.  While your procedure is being performed, your family may wait in the Radiology Waiting Room on the 1st floor in Radiology.  if they need to leave, they may provide a number to be called following the procedure.   Be prepared to stay overnight just in case. Sometimes procedures will indicate the need for further observation or treatment.   If you are scheduled for a follow-up visit with the Interventional Radiologist after your procedure, you will be called with a date and time.    Special Instructions (Medications to stop taking before your procedure etc.):  No aspirin products for 5 days before the procedure.  Please take your blood pressure medications per your usual schedule.

## 2025-04-14 ENCOUNTER — HOSPITAL ENCOUNTER (OUTPATIENT)
Dept: RADIOLOGY | Facility: HOSPITAL | Age: 65
Discharge: HOME | End: 2025-04-14
Attending: STUDENT IN AN ORGANIZED HEALTH CARE EDUCATION/TRAINING PROGRAM | Admitting: STUDENT IN AN ORGANIZED HEALTH CARE EDUCATION/TRAINING PROGRAM
Payer: MEDICARE

## 2025-04-14 VITALS
RESPIRATION RATE: 18 BRPM | HEART RATE: 59 BPM | DIASTOLIC BLOOD PRESSURE: 62 MMHG | OXYGEN SATURATION: 94 % | WEIGHT: 187 LBS | HEIGHT: 64 IN | SYSTOLIC BLOOD PRESSURE: 118 MMHG | TEMPERATURE: 98.2 F | BODY MASS INDEX: 31.92 KG/M2

## 2025-04-14 DIAGNOSIS — N18.32 STAGE 3B CHRONIC KIDNEY DISEASE (HCC): ICD-10-CM

## 2025-04-14 LAB
ERYTHROCYTE [DISTWIDTH] IN BLOOD BY AUTOMATED COUNT: 11.9 % (ref 11.6–15.1)
HCT VFR BLD AUTO: 40.3 % (ref 36.5–46.1)
HGB BLD-MCNC: 13.8 G/DL (ref 12–15.4)
INR PPP: 0.89 (ref 0.85–1.19)
MCH RBC QN AUTO: 30.2 PG (ref 26.8–34.3)
MCHC RBC AUTO-ENTMCNC: 34.2 G/DL (ref 31.4–37.4)
MCV RBC AUTO: 88 FL (ref 82–98)
PLATELET # BLD AUTO: 253 THOUSANDS/UL (ref 149–390)
PMV BLD AUTO: 10.1 FL (ref 8.9–12.7)
PROTHROMBIN TIME: 12.4 SECONDS (ref 12.3–15)
RBC # BLD AUTO: 4.57 MILLION/UL (ref 3.88–5.12)
WBC # BLD AUTO: 7.13 THOUSAND/UL (ref 4.31–10.16)

## 2025-04-14 PROCEDURE — 88346 IMFLUOR 1ST 1ANTB STAIN PX: CPT | Performed by: INTERNAL MEDICINE

## 2025-04-14 PROCEDURE — 76942 ECHO GUIDE FOR BIOPSY: CPT

## 2025-04-14 PROCEDURE — 88348 ELECTRON MICROSCOPY DX: CPT | Performed by: INTERNAL MEDICINE

## 2025-04-14 PROCEDURE — 88313 SPECIAL STAINS GROUP 2: CPT | Performed by: INTERNAL MEDICINE

## 2025-04-14 PROCEDURE — 85610 PROTHROMBIN TIME: CPT | Performed by: STUDENT IN AN ORGANIZED HEALTH CARE EDUCATION/TRAINING PROGRAM

## 2025-04-14 PROCEDURE — 85027 COMPLETE CBC AUTOMATED: CPT | Performed by: STUDENT IN AN ORGANIZED HEALTH CARE EDUCATION/TRAINING PROGRAM

## 2025-04-14 PROCEDURE — 88329 PATH CONSLTJ DRG SURG: CPT | Performed by: SPECIALIST

## 2025-04-14 PROCEDURE — 50200 RENAL BIOPSY PERQ: CPT

## 2025-04-14 PROCEDURE — 88350 IMFLUOR EA ADDL 1ANTB STN PX: CPT | Performed by: INTERNAL MEDICINE

## 2025-04-14 PROCEDURE — 88305 TISSUE EXAM BY PATHOLOGIST: CPT | Performed by: INTERNAL MEDICINE

## 2025-04-14 RX ORDER — LIDOCAINE WITH 8.4% SOD BICARB 0.9%(10ML)
SYRINGE (ML) INJECTION AS NEEDED
Status: COMPLETED | OUTPATIENT
Start: 2025-04-14 | End: 2025-04-14

## 2025-04-14 RX ORDER — SODIUM CHLORIDE 9 MG/ML
30 INJECTION, SOLUTION INTRAVENOUS CONTINUOUS
Status: DISCONTINUED | OUTPATIENT
Start: 2025-04-14 | End: 2025-04-15 | Stop reason: HOSPADM

## 2025-04-14 RX ORDER — MIDAZOLAM HYDROCHLORIDE 2 MG/2ML
INJECTION, SOLUTION INTRAMUSCULAR; INTRAVENOUS AS NEEDED
Status: COMPLETED | OUTPATIENT
Start: 2025-04-14 | End: 2025-04-14

## 2025-04-14 RX ORDER — FENTANYL CITRATE 50 UG/ML
INJECTION, SOLUTION INTRAMUSCULAR; INTRAVENOUS AS NEEDED
Status: COMPLETED | OUTPATIENT
Start: 2025-04-14 | End: 2025-04-14

## 2025-04-14 RX ADMIN — Medication 10 ML: at 10:39

## 2025-04-14 RX ADMIN — FENTANYL CITRATE 50 MCG: 50 INJECTION INTRAMUSCULAR; INTRAVENOUS at 10:30

## 2025-04-14 RX ADMIN — MIDAZOLAM 1 MG: 1 INJECTION INTRAMUSCULAR; INTRAVENOUS at 10:30

## 2025-04-14 RX ADMIN — SODIUM CHLORIDE 30 ML/HR: 0.9 INJECTION, SOLUTION INTRAVENOUS at 09:50

## 2025-04-14 NOTE — BRIEF OP NOTE (RAD/CATH)
INTERVENTIONAL RADIOLOGY PROCEDURE NOTE    Date: 4/14/2025    Procedure:   Procedure Summary       Date: 04/14/25 Room / Location: [facility] Interventional Radiology    Anesthesia Start:  Anesthesia Stop:     Procedure: IR BIOPSY KIDNEY RANDOM Diagnosis:       Stage 3b chronic kidney disease (HCC)      (ckd)    Scheduled Providers:  Responsible Provider:     Anesthesia Type: Not recorded ASA Status: Not recorded            Preoperative diagnosis:   1. Stage 3b chronic kidney disease (HCC)         Postoperative diagnosis: Same.    Surgeon: Jessee Calhoun MD     Assistant: None. No qualified resident was available.    Blood loss: 5 ml    Specimens: sent to path     Findings:   US guided nontargeted right lower pole renal biopsy.    Complications: None immediate.    Anesthesia: conscious sedation

## 2025-04-14 NOTE — SEDATION DOCUMENTATION
Kidney biopsy performed by Dr. Calhoun. Patient tolerated well. Accessed right kidney, closed with adhesive bandage. Specimen collected and given to pathology at bedside. 4 hours of bedrest starting at 1100. Report given to short stay RN.

## 2025-04-14 NOTE — DISCHARGE INSTRUCTIONS
Percutaneous Kidney Biopsy   WHAT YOU NEED TO KNOW:   A percutaneous kidney biopsy is a procedure to remove a small sample of kidney tissue. It may also be done to check for kidney disease or cancer.     DISCHARGE INSTRUCTIONS:   Follow up with your healthcare provider as directed:  Write down your questions so you remember to ask them during your visits.   Wound care:  The Band-Aid may be removed in 24 hours.                                                                                                For more information:   National Kidney and Urologic Diseases Information Clearinghouse  [address] [place] [address]  Phone: 0- [phone]  Web Address: http://kidney.niddk.nih.gov/   Care after your procedure:    1. Limit your activities for 36 hours after your biopsy.    2. No driving day of biopsy.    3. Return to your normal diet. Flat sips of flat soda helps with mild nausea.    4. Remove band-aid or dressing 24 hours after procedure.       Contact Interventional Radiology at [phone]    ([facility] PATIENTS: Contact Interventional Radiology at [phone]) ([facility] PATIENTS: Contact Interventional Radiology at [phone]) if:    1. Difficulty breathing, nausea or vomiting.    2  You feel weak or dizzy.    3. Chills or fever above 101 degrees F.     You have persistent nausea or vomiting    4. You have severe pain in your abdomen or where You feel weak or dizzy.your           procedure was done.    5  You have blood in your urine. You urinate small amounts or not at all.    4. Develop any redness, swelling, heat, unusual drainage, heavy bruising or bleeding     from biopsy site.

## 2025-04-14 NOTE — H&P
"Interventional Radiology Preprocedure Note    History/Indication for procedure:   Damien Brumfield is a 65 y.o. adult with a PMH of CKD who presents for image guided nontargeted renal biopsy.    Relevant past medical history:    Past Medical History:   Diagnosis Date    Chronic kidney disease     Hypertension     Kidney stone      Patient Active Problem List   Diagnosis    Hypertension    Hyperthyroidism    Low testosterone    Stage 3b chronic kidney disease (HCC)    Persistent proteinuria       /72 (BP Location: Right arm)   Pulse 58   Temp 98.2 °F (36.8 °C) (Oral)   Resp 16   Ht 5' 4\" (1.626 m)   Wt 84.8 kg (187 lb)   SpO2 95%   BMI 32.10 kg/m²     Medications:    Inpatient Medications:     Scheduled Medications:  Current Facility-Administered Medications   Medication Dose Route Frequency Provider Last Rate    sodium chloride  30 mL/hr Intravenous Continuous Jessee Calhoun MD 30 mL/hr (04/14/25 0950)       Infusions:  sodium chloride, 30 mL/hr, Last Rate: 30 mL/hr (04/14/25 0950)        PRN:      Outpatient Medications:  Current Outpatient Medications on File Prior to Encounter   Medication Sig Dispense Refill    amLODIPine (NORVASC) 10 mg tablet Take 1 tablet (10 mg total) by mouth daily 90 tablet 1    atorvastatin (LIPITOR) 10 mg tablet Take 10 mg by mouth daily      lisinopril (ZESTRIL) 40 mg tablet Take 40 mg by mouth daily      methimazole (TAPAZOLE) 5 mg tablet TAKE 1 TABLET BY MOUTH TWICE A  tablet 3    spironolactone (ALDACTONE) 25 mg tablet TAKE 1 TABLET (25 MG TOTAL) BY MOUTH DAILY. 90 tablet 1     No current facility-administered medications on file prior to encounter.       Allergies   Allergen Reactions    Penicillins Hives       Anticoagulants: none    ASA classification: ASA 3 - Patient with moderate systemic disease with functional limitations    Airway Assessment: III (soft and hard palate and base of uvula visible)    Relevant family history: None    Relevant review of systems: " "None    Prior sedation/anesthesia: yes    Can the patient lie flat? Yes     NPO Status: yes    Labs:   CBC with diff:   Lab Results   Component Value Date    WBC 7.13 04/14/2025    HGB 13.8 04/14/2025    HCT 40.3 04/14/2025    MCV 88 04/14/2025     04/14/2025    RBC 4.57 04/14/2025    MCH 30.2 04/14/2025    MCHC 34.2 04/14/2025    RDW 11.9 04/14/2025    MPV 10.1 04/14/2025     BMP/CMP:  Lab Results   Component Value Date    K 5.1 03/11/2025    K 4.9 03/11/2025     03/11/2025     03/11/2025    CO2 26 03/11/2025    CO2 26 03/11/2025    BUN 29 (H) 03/11/2025    BUN 28 (H) 03/11/2025    CREATININE 2.26 (H) 03/11/2025    CREATININE 2.3 (H) 03/11/2025    CALCIUM 9.3 03/11/2025    CALCIUM 9.4 03/11/2025    AST 18 03/11/2025    ALT 33 03/11/2025    ALKPHOS 69 03/11/2025    EGFR 31 (L) 03/11/2025    EGFR 31 (L) 03/11/2025   ,     Coags: No results found for: \"PT\", \"PTT\", \"INR\",          Relevant imaging studies:   Reviewed.    Directed physical examination:  I agree with the physical exam performed on 3/18/25 and there are no additional changes.    Assessment/Plan:   Nontargeted renal biopsy.    Sedation/Anesthesia plan:  Moderate sedation will be used as needed for procedure.    Consent with alternatives to the procedure, risks and benefits have been explained and discussed with the patient/patient's family: yes.    "

## 2025-04-16 DIAGNOSIS — I10 PRIMARY HYPERTENSION: ICD-10-CM

## 2025-04-16 DIAGNOSIS — N18.32 STAGE 3B CHRONIC KIDNEY DISEASE (HCC): Primary | ICD-10-CM

## 2025-04-16 RX ORDER — DILTIAZEM HYDROCHLORIDE 180 MG/1
180 CAPSULE, COATED, EXTENDED RELEASE ORAL DAILY
Qty: 90 CAPSULE | Refills: 3 | Status: SHIPPED | OUTPATIENT
Start: 2025-04-16

## 2025-05-30 ENCOUNTER — TELEPHONE (OUTPATIENT)
Age: 65
End: 2025-05-30

## 2025-05-30 NOTE — TELEPHONE ENCOUNTER
Patient's wife called stating that Dr George permanently took him off a medication after his biopsy. His PCP is asking what the medication is. She would like a call back with clarification on what medication Dr George permanently took the patient off of.       Please advise,

## 2025-05-30 NOTE — TELEPHONE ENCOUNTER
Called spoke with patient and spouse Enmanuel advised per Dr. REJI George.that the medication that has been stopped is amlodipine.  Both patient and spouse verbalized understanding and are okay with it.

## 2025-06-02 ENCOUNTER — TELEPHONE (OUTPATIENT)
Age: 65
End: 2025-06-02

## 2025-06-02 NOTE — TELEPHONE ENCOUNTER
Patients wife, Edwar called in to schedule New Patient appointment, referred by PCP.    Appointment scheduled 7/17/2025 at 1 pm with Dr. Starr in [place].    Scheduled per decision tree, added to wait list.

## 2025-06-20 LAB
CREAT ?TM UR-SCNC: 134 UMOL/L
EXT ALBUMIN URINE RANDOM: 117.7
MICROALBUMIN/CREAT UR: 878 MG/G{CREAT}

## 2025-06-21 LAB
T3FREE SERPL-MCNC: 3.3 PG/ML (ref 2.3–4.2)
T4 FREE SERPL-MCNC: 1.3 NG/DL (ref 0.8–1.8)
TSH SERPL-ACNC: 0.05 MIU/L (ref 0.4–4.5)

## 2025-06-23 ENCOUNTER — RESULTS FOLLOW-UP (OUTPATIENT)
Dept: ENDOCRINOLOGY | Facility: CLINIC | Age: 65
End: 2025-06-23

## 2025-06-26 LAB
SHBG SERPL-SCNC: 23 NMOL/L (ref 22–77)
TESTOST FREE SERPL-MCNC: 46.9 PG/ML (ref 35–155)
TESTOST SERPL-MCNC: 218 NG/DL (ref 250–1100)
TSH SERPL-ACNC: 0.04 MIU/L
TSH SERPL-ACNC: 0.04 MIU/L (ref 0.4–4.5)

## 2025-06-30 ENCOUNTER — OFFICE VISIT (OUTPATIENT)
Dept: ENDOCRINOLOGY | Facility: CLINIC | Age: 65
End: 2025-06-30
Payer: MEDICARE

## 2025-06-30 VITALS
BODY MASS INDEX: 31.96 KG/M2 | DIASTOLIC BLOOD PRESSURE: 82 MMHG | HEART RATE: 52 BPM | WEIGHT: 187.2 LBS | SYSTOLIC BLOOD PRESSURE: 140 MMHG | HEIGHT: 64 IN

## 2025-06-30 DIAGNOSIS — E55.9 VITAMIN D DEFICIENCY: ICD-10-CM

## 2025-06-30 DIAGNOSIS — E05.90 THYROTOXICOSIS WITHOUT THYROID STORM, UNSPECIFIED THYROTOXICOSIS TYPE: ICD-10-CM

## 2025-06-30 DIAGNOSIS — E05.90 HYPERTHYROIDISM: Primary | ICD-10-CM

## 2025-06-30 DIAGNOSIS — I10 PRIMARY HYPERTENSION: ICD-10-CM

## 2025-06-30 DIAGNOSIS — R63.5 WEIGHT GAIN: ICD-10-CM

## 2025-06-30 DIAGNOSIS — R79.89 LOW TESTOSTERONE: ICD-10-CM

## 2025-06-30 PROCEDURE — G2211 COMPLEX E/M VISIT ADD ON: HCPCS | Performed by: PHYSICIAN ASSISTANT

## 2025-06-30 PROCEDURE — 99214 OFFICE O/P EST MOD 30 MIN: CPT | Performed by: PHYSICIAN ASSISTANT

## 2025-06-30 RX ORDER — METHIMAZOLE 5 MG/1
15 TABLET ORAL 2 TIMES DAILY
Qty: 540 TABLET | Refills: 2 | Status: SHIPPED | OUTPATIENT
Start: 2025-06-30

## 2025-06-30 NOTE — ASSESSMENT & PLAN NOTE
TSH: 0.05  TSH with HAMA: 0.04  T4: 1.3  T3: 3.3  Maintained on methimazole 10mg daily, increase to 15mg daily  Repeat labs in 4 weeks.   Orders:  •  TSH + Free T4; Future  •  TSH + Free T4; Future

## 2025-06-30 NOTE — ASSESSMENT & PLAN NOTE
Total: 218  Free: 46.9  SHB  Encourage weight loss  Orders:  •  Ambulatory Referral to Sleep Medicine; Future  •  Iron Panel (Includes Ferritin, Iron Sat%, Iron, and TIBC); Future

## 2025-06-30 NOTE — PROGRESS NOTES
Name: Damien Brumfield      : 1960      MRN: [id number]  Encounter Provider: Flakita Mcgarry PA-C  Encounter Date: 2025   Encounter department: [facility]    Chief Complaint   Patient presents with   • Hyperthyroidism   :  Assessment & Plan  Hyperthyroidism  TSH: 0.05  TSH with HAMA: 0.04  T4: 1.3  T3: 3.3  Maintained on methimazole 10mg daily, increase to 15mg daily  Repeat labs in 4 weeks.   Orders:  •  TSH + Free T4; Future  •  TSH + Free T4; Future    Primary hypertension  BP close to goal.   Continue current medication regimen.        Low testosterone  Total: 218  Free: 46.9  SHB  Encourage weight loss  Orders:  •  Ambulatory Referral to Sleep Medicine; Future  •  Iron Panel (Includes Ferritin, Iron Sat%, Iron, and TIBC); Future    Thyrotoxicosis without thyroid storm, unspecified thyrotoxicosis type    Orders:  •  methimazole (TAPAZOLE) 5 mg tablet; Take 3 tablets (15 mg total) by mouth 2 (two) times a day    Vitamin D deficiency    Orders:  •  Vitamin D 25 hydroxy; Future    Weight gain  30lb weight gain from  - , has remained elevated between 185-190s  Previously normal dexamethasone suppression test  Obtain iron panel, Vitamin D and cortisol levels   Orders:  •  Cortisol Level, AM Specimen; Future            History of Present Illness {?Quick Links Encounters * My Last Note * Last Note in Specialty * Snapshot * Since Last Visit * History :62699}  History of Present Illness  Damien Brumfield is a 65 y.o. adult male with hypothyroidism presents for a follow-up visit. He reports a stable condition, satisfactory sleep averaging 8 hours per night, no symptoms of sleep apnea, and stable energy levels. He is currently taking methimazole 10 mg daily.    The patient expresses concern about weight gain despite maintaining a balanced diet consisting of fruits and vegetables and avoiding junk food. His current weight is 187 lbs, which reflects a 4-lb loss  "since his last visit in March 2025.      Review of Systems as per [facility] [place]       Medical History Reviewed by provider this encounter:     .    Objective {?Quick Links Trend Vitals * Enter New Vitals * Results Review * Timeline (Adult) * Labs * Imaging * Cardiology * Procedures * Lung Cancer Screening * Surgical eConsent :89871}  /82 (BP Location: Right arm, Patient Position: Sitting, Cuff Size: Adult)   Pulse (!) 52   Ht 5' 4\" (1.626 m)   Wt 84.9 kg (187 lb 3.2 oz)   BMI 32.13 kg/m²      Body mass index is 32.13 kg/m².  Wt Readings from Last 3 Encounters:   06/30/25 84.9 kg (187 lb 3.2 oz)   04/14/25 84.8 kg (187 lb)   03/18/25 84.8 kg (187 lb)     Physical Exam  Physical Exam  General Appearance: well-developed, well-appearing, in no acute distress. Vital signs: Within normal limits. HEENT: Normocephalic and atraumatic. Eyes: No scleral icterus. Conjunctivae normal. Respiratory: Pulmonary effort is normal. Skin: Warm and dry, no rash. Neurological: Alert. Psychiatric: Attention normal.    Results  TSH: 0.05 (very low).  Labs: I have reviewed pertinent labs including:     There are no Patient Instructions on file for this visit.    Discussed with the patient and all questioned fully answered. He will call me if any problems arise.      "

## 2025-07-10 DIAGNOSIS — I10 PRIMARY HYPERTENSION: ICD-10-CM

## 2025-07-10 RX ORDER — SPIRONOLACTONE 25 MG/1
25 TABLET ORAL DAILY
Qty: 90 TABLET | Refills: 1 | Status: SHIPPED | OUTPATIENT
Start: 2025-07-10

## 2025-07-17 ENCOUNTER — OFFICE VISIT (OUTPATIENT)
Dept: NEUROLOGY | Facility: CLINIC | Age: 65
End: 2025-07-17
Payer: MEDICARE

## 2025-07-17 VITALS
HEART RATE: 67 BPM | BODY MASS INDEX: 31.92 KG/M2 | HEIGHT: 64 IN | OXYGEN SATURATION: 95 % | WEIGHT: 187 LBS | DIASTOLIC BLOOD PRESSURE: 70 MMHG | SYSTOLIC BLOOD PRESSURE: 120 MMHG

## 2025-07-17 DIAGNOSIS — R29.818 PARKINSONIAN FEATURES: ICD-10-CM

## 2025-07-17 DIAGNOSIS — R25.3 MUSCLE TWITCHING: Primary | ICD-10-CM

## 2025-07-17 DIAGNOSIS — G47.52 REM SLEEP BEHAVIOR DISORDER: ICD-10-CM

## 2025-07-17 PROCEDURE — 99204 OFFICE O/P NEW MOD 45 MIN: CPT | Performed by: PSYCHIATRY & NEUROLOGY

## 2025-07-17 RX ORDER — CARBIDOPA AND LEVODOPA 25; 100 MG/1; MG/1
1 TABLET ORAL 3 TIMES DAILY
Qty: 90 TABLET | Refills: 2 | Status: SHIPPED | OUTPATIENT
Start: 2025-07-17

## 2025-07-17 NOTE — PATIENT INSTRUCTIONS
Contact [facility] Central Scheduling at [phone] or [phone](toll-free) (Hours: Monday to Friday: 7:30 am to 5:30 pm, Saturday: 8 am to 12 pm).    Ordered MRI brain  Ordered TIEN scan. Will need consultation appointment with radiologist prior to test  Ordered sleep study. Please also make an appointment with sleep medicine team  Ordered Sinemet 1 tablet three times a day.  -Often recommended to take Sinemet on an empty stomach or with a low-protein meal to maximize absorption. High protein diets can interfere with the absorption of Sinemet, especially close to the time of taking Sinemet. Some providers recommend avoiding any high-protein meals 30 min to 1 hour before and after taking Sinemet.  -Consider taking Sinemet at least 30 minutes before a meal or 1-2 hours after a meal to minimize protein interference. Can also consider consuming higher-protein foods, like red meat, in the evening to avoid interference with daytime doses of Sinemet.    Reach out to our office any questions or concerns. Please let me know in about 1 week how you are doing on Sinemet.

## 2025-07-23 ENCOUNTER — TRANSCRIBE ORDERS (OUTPATIENT)
Dept: SLEEP CENTER | Facility: CLINIC | Age: 65
End: 2025-07-23

## 2025-07-23 DIAGNOSIS — G47.52 REM SLEEP BEHAVIOR DISORDER: ICD-10-CM

## 2025-07-23 DIAGNOSIS — R29.818 PARKINSONIAN FEATURES: ICD-10-CM

## 2025-07-23 DIAGNOSIS — G47.8 OTHER SLEEP DISORDERS: Primary | ICD-10-CM

## 2025-07-30 ENCOUNTER — HOSPITAL ENCOUNTER (OUTPATIENT)
Dept: RADIOLOGY | Facility: HOSPITAL | Age: 65
Discharge: HOME | End: 2025-07-30

## 2025-07-30 DIAGNOSIS — R29.818 PARKINSONIAN FEATURES: ICD-10-CM

## 2025-08-11 ENCOUNTER — HOSPITAL ENCOUNTER (OUTPATIENT)
Dept: SLEEP CENTER | Facility: CLINIC | Age: 65
Discharge: HOME | End: 2025-08-11
Payer: MEDICARE

## 2025-08-12 PROBLEM — G47.33 OSA (OBSTRUCTIVE SLEEP APNEA): Status: ACTIVE | Noted: 2025-08-12

## 2025-08-13 ENCOUNTER — HOSPITAL ENCOUNTER (OUTPATIENT)
Dept: RADIOLOGY | Facility: HOSPITAL | Age: 65
Discharge: HOME | End: 2025-08-13
Payer: MEDICARE

## 2025-08-14 ENCOUNTER — TELEPHONE (OUTPATIENT)
Dept: NEUROLOGY | Facility: CLINIC | Age: 65
End: 2025-08-14

## 2025-08-15 DIAGNOSIS — R29.818 PARKINSONIAN FEATURES: ICD-10-CM

## 2025-08-18 ENCOUNTER — HOSPITAL ENCOUNTER (OUTPATIENT)
Dept: MRI IMAGING | Facility: HOSPITAL | Age: 65
Discharge: HOME | End: 2025-08-18
Payer: MEDICARE

## 2025-08-18 DIAGNOSIS — R29.818 PARKINSONIAN FEATURES: ICD-10-CM

## 2025-08-18 DIAGNOSIS — R25.3 MUSCLE TWITCHING: ICD-10-CM

## 2025-08-18 PROCEDURE — A9585 GADOBUTROL INJECTION: HCPCS

## 2025-08-18 PROCEDURE — 70553 MRI BRAIN STEM W/O & W/DYE: CPT

## 2025-08-18 RX ORDER — GADOBUTROL 604.72 MG/ML
8 INJECTION INTRAVENOUS
Status: COMPLETED | OUTPATIENT
Start: 2025-08-18 | End: 2025-08-18

## 2025-08-18 RX ORDER — CARBIDOPA AND LEVODOPA 25; 100 MG/1; MG/1
1 TABLET ORAL 3 TIMES DAILY
Qty: 270 TABLET | Refills: 1 | OUTPATIENT
Start: 2025-08-18

## 2025-08-18 RX ADMIN — GADOBUTROL 8 ML: 604.72 INJECTION INTRAVENOUS at 07:32

## 2025-08-21 ENCOUNTER — PATIENT MESSAGE (OUTPATIENT)
Dept: NEUROLOGY | Facility: CLINIC | Age: 65
End: 2025-08-21

## 2025-08-21 DIAGNOSIS — I63.9 STROKE (CEREBRUM) (HCC): Primary | ICD-10-CM

## 2025-08-21 DIAGNOSIS — E11.59 TYPE 2 DIABETES MELLITUS WITH OTHER CIRCULATORY COMPLICATIONS (HCC): ICD-10-CM

## 2025-08-21 DIAGNOSIS — I63.89 OTHER CEREBRAL INFARCTION (HCC): ICD-10-CM

## 2025-08-21 RX ORDER — ASPIRIN 81 MG/1
81 TABLET ORAL DAILY
Qty: 90 TABLET | Refills: 3 | Status: SHIPPED | OUTPATIENT
Start: 2025-08-21